# Patient Record
Sex: MALE | Race: WHITE | NOT HISPANIC OR LATINO | Employment: FULL TIME | URBAN - METROPOLITAN AREA
[De-identification: names, ages, dates, MRNs, and addresses within clinical notes are randomized per-mention and may not be internally consistent; named-entity substitution may affect disease eponyms.]

---

## 2020-06-13 DIAGNOSIS — Z20.822 SUSPECTED COVID-19 VIRUS INFECTION: ICD-10-CM

## 2020-08-11 ENCOUNTER — HISTORICAL (OUTPATIENT)
Dept: CARDIOLOGY | Facility: HOSPITAL | Age: 55
End: 2020-08-11

## 2020-08-12 ENCOUNTER — HISTORICAL (OUTPATIENT)
Dept: CARDIOLOGY | Facility: HOSPITAL | Age: 55
End: 2020-08-12

## 2020-10-12 ENCOUNTER — HISTORICAL (OUTPATIENT)
Dept: ADMINISTRATIVE | Facility: HOSPITAL | Age: 55
End: 2020-10-12

## 2021-03-11 ENCOUNTER — HISTORICAL (OUTPATIENT)
Dept: CARDIOLOGY | Facility: CLINIC | Age: 56
End: 2021-03-11

## 2021-03-11 LAB
ALBUMIN SERPL-MCNC: 3.9 GM/DL (ref 3.5–5)
ALBUMIN/GLOB SERPL: 1.4 RATIO (ref 1.1–2)
ALP SERPL-CCNC: 63 UNIT/L (ref 40–150)
ALT SERPL-CCNC: 10 UNIT/L (ref 0–55)
AST SERPL-CCNC: 12 UNIT/L (ref 5–34)
BILIRUB SERPL-MCNC: 0.5 MG/DL
BILIRUBIN DIRECT+TOT PNL SERPL-MCNC: 0.2 MG/DL (ref 0–0.5)
BILIRUBIN DIRECT+TOT PNL SERPL-MCNC: 0.3 MG/DL (ref 0–0.8)
BUN SERPL-MCNC: 12.2 MG/DL (ref 8.4–25.7)
CALCIUM SERPL-MCNC: 9.2 MG/DL (ref 8.4–10.2)
CHLORIDE SERPL-SCNC: 106 MMOL/L (ref 98–107)
CO2 SERPL-SCNC: 26 MMOL/L (ref 22–29)
CREAT SERPL-MCNC: 0.85 MG/DL (ref 0.73–1.18)
GLOBULIN SER-MCNC: 2.8 GM/DL (ref 2.4–3.5)
GLUCOSE SERPL-MCNC: 96 MG/DL (ref 74–100)
POTASSIUM SERPL-SCNC: 4 MMOL/L (ref 3.5–5.1)
PROT SERPL-MCNC: 6.7 GM/DL (ref 6.4–8.3)
SODIUM SERPL-SCNC: 139 MMOL/L (ref 136–145)

## 2021-04-16 ENCOUNTER — HISTORICAL (OUTPATIENT)
Dept: CARDIOLOGY | Facility: CLINIC | Age: 56
End: 2021-04-16

## 2021-04-16 LAB
ALBUMIN SERPL-MCNC: 4.1 GM/DL (ref 3.5–5)
ALBUMIN/GLOB SERPL: 1.3 RATIO (ref 1.1–2)
ALP SERPL-CCNC: 67 UNIT/L (ref 40–150)
ALT SERPL-CCNC: 10 UNIT/L (ref 0–55)
AST SERPL-CCNC: 17 UNIT/L (ref 5–34)
BILIRUB SERPL-MCNC: 0.7 MG/DL
BILIRUBIN DIRECT+TOT PNL SERPL-MCNC: 0.3 MG/DL (ref 0–0.5)
BILIRUBIN DIRECT+TOT PNL SERPL-MCNC: 0.4 MG/DL (ref 0–0.8)
BUN SERPL-MCNC: 12.8 MG/DL (ref 8.4–25.7)
CALCIUM SERPL-MCNC: 9.9 MG/DL (ref 8.4–10.2)
CHLORIDE SERPL-SCNC: 105 MMOL/L (ref 98–107)
CHOLEST SERPL-MCNC: 144 MG/DL
CHOLEST/HDLC SERPL: 3 {RATIO} (ref 0–5)
CO2 SERPL-SCNC: 24 MMOL/L (ref 22–29)
CREAT SERPL-MCNC: 0.8 MG/DL (ref 0.73–1.18)
GLOBULIN SER-MCNC: 3.1 GM/DL (ref 2.4–3.5)
GLUCOSE SERPL-MCNC: 99 MG/DL (ref 74–100)
HDLC SERPL-MCNC: 49 MG/DL (ref 35–60)
LDLC SERPL CALC-MCNC: 69 MG/DL (ref 50–140)
POTASSIUM SERPL-SCNC: 4.1 MMOL/L (ref 3.5–5.1)
PROT SERPL-MCNC: 7.2 GM/DL (ref 6.4–8.3)
SODIUM SERPL-SCNC: 138 MMOL/L (ref 136–145)
TRIGL SERPL-MCNC: 128 MG/DL (ref 34–140)
VLDLC SERPL CALC-MCNC: 26 MG/DL

## 2021-06-04 ENCOUNTER — HISTORICAL (OUTPATIENT)
Dept: SLEEP MEDICINE | Facility: HOSPITAL | Age: 56
End: 2021-06-04

## 2021-07-19 ENCOUNTER — HISTORICAL (OUTPATIENT)
Dept: ADMINISTRATIVE | Facility: HOSPITAL | Age: 56
End: 2021-07-19

## 2021-07-19 LAB — SARS-COV-2 RNA RESP QL NAA+PROBE: DETECTED

## 2021-10-22 ENCOUNTER — HISTORICAL (OUTPATIENT)
Dept: CARDIOLOGY | Facility: HOSPITAL | Age: 56
End: 2021-10-22

## 2021-10-25 ENCOUNTER — HISTORICAL (OUTPATIENT)
Dept: RADIOLOGY | Facility: HOSPITAL | Age: 56
End: 2021-10-25

## 2021-12-23 ENCOUNTER — HISTORICAL (OUTPATIENT)
Dept: RADIOLOGY | Facility: HOSPITAL | Age: 56
End: 2021-12-23

## 2022-01-20 ENCOUNTER — HISTORICAL (OUTPATIENT)
Dept: CARDIOLOGY | Facility: HOSPITAL | Age: 57
End: 2022-01-20

## 2022-04-09 ENCOUNTER — HISTORICAL (OUTPATIENT)
Dept: ADMINISTRATIVE | Facility: HOSPITAL | Age: 57
End: 2022-04-09
Payer: MEDICAID

## 2022-04-26 VITALS
HEIGHT: 67 IN | OXYGEN SATURATION: 97 % | BODY MASS INDEX: 38.76 KG/M2 | SYSTOLIC BLOOD PRESSURE: 113 MMHG | WEIGHT: 246.94 LBS | DIASTOLIC BLOOD PRESSURE: 68 MMHG

## 2022-07-28 ENCOUNTER — OFFICE VISIT (OUTPATIENT)
Dept: CARDIOLOGY | Facility: CLINIC | Age: 57
End: 2022-07-28
Payer: MEDICAID

## 2022-07-28 VITALS
HEIGHT: 68 IN | TEMPERATURE: 98 F | HEART RATE: 60 BPM | SYSTOLIC BLOOD PRESSURE: 108 MMHG | BODY MASS INDEX: 38.15 KG/M2 | OXYGEN SATURATION: 98 % | WEIGHT: 251.75 LBS | RESPIRATION RATE: 18 BRPM | DIASTOLIC BLOOD PRESSURE: 71 MMHG

## 2022-07-28 DIAGNOSIS — I10 HTN (HYPERTENSION), BENIGN: ICD-10-CM

## 2022-07-28 DIAGNOSIS — G47.33 OBSTRUCTIVE SLEEP APNEA: ICD-10-CM

## 2022-07-28 DIAGNOSIS — I25.10 CORONARY ARTERY DISEASE INVOLVING NATIVE CORONARY ARTERY OF NATIVE HEART WITHOUT ANGINA PECTORIS: Primary | ICD-10-CM

## 2022-07-28 DIAGNOSIS — E78.2 MIXED HYPERLIPIDEMIA: ICD-10-CM

## 2022-07-28 PROCEDURE — 3078F DIAST BP <80 MM HG: CPT | Mod: CPTII,,, | Performed by: NURSE PRACTITIONER

## 2022-07-28 PROCEDURE — 1159F MED LIST DOCD IN RCRD: CPT | Mod: CPTII,,, | Performed by: NURSE PRACTITIONER

## 2022-07-28 PROCEDURE — 99214 OFFICE O/P EST MOD 30 MIN: CPT | Mod: S$PBB,,, | Performed by: NURSE PRACTITIONER

## 2022-07-28 PROCEDURE — 3074F PR MOST RECENT SYSTOLIC BLOOD PRESSURE < 130 MM HG: ICD-10-PCS | Mod: CPTII,,, | Performed by: NURSE PRACTITIONER

## 2022-07-28 PROCEDURE — 3008F PR BODY MASS INDEX (BMI) DOCUMENTED: ICD-10-PCS | Mod: CPTII,,, | Performed by: NURSE PRACTITIONER

## 2022-07-28 PROCEDURE — 99214 PR OFFICE/OUTPT VISIT, EST, LEVL IV, 30-39 MIN: ICD-10-PCS | Mod: S$PBB,,, | Performed by: NURSE PRACTITIONER

## 2022-07-28 PROCEDURE — 3078F PR MOST RECENT DIASTOLIC BLOOD PRESSURE < 80 MM HG: ICD-10-PCS | Mod: CPTII,,, | Performed by: NURSE PRACTITIONER

## 2022-07-28 PROCEDURE — 1159F PR MEDICATION LIST DOCUMENTED IN MEDICAL RECORD: ICD-10-PCS | Mod: CPTII,,, | Performed by: NURSE PRACTITIONER

## 2022-07-28 PROCEDURE — 99214 OFFICE O/P EST MOD 30 MIN: CPT | Mod: PBBFAC | Performed by: NURSE PRACTITIONER

## 2022-07-28 PROCEDURE — 3008F BODY MASS INDEX DOCD: CPT | Mod: CPTII,,, | Performed by: NURSE PRACTITIONER

## 2022-07-28 PROCEDURE — 1160F PR REVIEW ALL MEDS BY PRESCRIBER/CLIN PHARMACIST DOCUMENTED: ICD-10-PCS | Mod: CPTII,,, | Performed by: NURSE PRACTITIONER

## 2022-07-28 PROCEDURE — 3074F SYST BP LT 130 MM HG: CPT | Mod: CPTII,,, | Performed by: NURSE PRACTITIONER

## 2022-07-28 PROCEDURE — 1160F RVW MEDS BY RX/DR IN RCRD: CPT | Mod: CPTII,,, | Performed by: NURSE PRACTITIONER

## 2022-07-28 RX ORDER — MELATONIN 5 MG
10 CAPSULE ORAL NIGHTLY
COMMUNITY

## 2022-07-28 RX ORDER — ISOSORBIDE MONONITRATE 30 MG/1
30 TABLET, EXTENDED RELEASE ORAL DAILY
Qty: 90 TABLET | Refills: 3 | Status: SHIPPED | OUTPATIENT
Start: 2022-07-28 | End: 2023-06-27 | Stop reason: SDUPTHER

## 2022-07-28 RX ORDER — ROSUVASTATIN CALCIUM 40 MG/1
40 TABLET, COATED ORAL DAILY
Qty: 90 TABLET | Refills: 3 | Status: SHIPPED | OUTPATIENT
Start: 2022-07-28 | End: 2023-06-27 | Stop reason: SDUPTHER

## 2022-07-28 RX ORDER — CLOPIDOGREL BISULFATE 75 MG/1
75 TABLET ORAL DAILY
Qty: 90 TABLET | Refills: 3 | Status: SHIPPED | OUTPATIENT
Start: 2022-07-28 | End: 2023-06-27 | Stop reason: SDUPTHER

## 2022-07-28 RX ORDER — ROSUVASTATIN CALCIUM 40 MG/1
40 TABLET, COATED ORAL DAILY
COMMUNITY
Start: 2022-07-26 | End: 2022-07-28 | Stop reason: SDUPTHER

## 2022-07-28 RX ORDER — METOPROLOL SUCCINATE 25 MG/1
25 TABLET, EXTENDED RELEASE ORAL DAILY
COMMUNITY
Start: 2022-07-15 | End: 2022-07-28 | Stop reason: SDUPTHER

## 2022-07-28 RX ORDER — METOPROLOL SUCCINATE 25 MG/1
25 TABLET, EXTENDED RELEASE ORAL DAILY
Qty: 90 TABLET | Refills: 3 | Status: SHIPPED | OUTPATIENT
Start: 2022-07-28 | End: 2022-10-28 | Stop reason: SDUPTHER

## 2022-07-28 RX ORDER — ISOSORBIDE MONONITRATE 30 MG/1
30 TABLET, EXTENDED RELEASE ORAL DAILY
COMMUNITY
Start: 2022-07-09 | End: 2022-07-28 | Stop reason: SDUPTHER

## 2022-07-28 RX ORDER — ALBUTEROL SULFATE 90 UG/1
90 AEROSOL, METERED RESPIRATORY (INHALATION)
COMMUNITY
Start: 2022-04-19

## 2022-07-28 RX ORDER — OMEPRAZOLE 20 MG/1
20 CAPSULE, DELAYED RELEASE ORAL
COMMUNITY

## 2022-07-28 RX ORDER — CLOPIDOGREL BISULFATE 75 MG/1
75 TABLET ORAL DAILY
COMMUNITY
Start: 2022-05-02 | End: 2022-07-28 | Stop reason: SDUPTHER

## 2022-07-28 RX ORDER — METOPROLOL TARTRATE 25 MG/1
25 TABLET, FILM COATED ORAL 2 TIMES DAILY
COMMUNITY
Start: 2022-02-26 | End: 2022-07-28

## 2022-07-28 RX ORDER — NITROGLYCERIN 0.4 MG/1
0.4 TABLET SUBLINGUAL
COMMUNITY
Start: 2021-12-28 | End: 2023-06-27 | Stop reason: SDUPTHER

## 2022-07-28 NOTE — PATIENT INSTRUCTIONS
Sleep study referral  Follow up in 3 months with CMP/FLP or sooner if needed  Follow up with PCP as directed

## 2022-07-28 NOTE — PROGRESS NOTES
"        CHIEF COMPLAINT:   Chief Complaint   Patient presents with    3mt f/u -      Pt reports 2 episodes of chest "sharp pain" x 30 seconds while at rest over the last two weeks.                     Review of patient's allergies indicates:   Allergen Reactions    Doxycycline Swelling                                          HPI:  Huseyin Sheth 56 y.o. male  with of HTN, HLD, Multivessel CAD with PCI in 2006, 2014, 2017 with multiple stents CABG 2020, who presents today for routine follow up and ongoing care.  Today the patient endorses 2 episodes of sharp chest pain that occurred while at rest that lasted approximately 30 seconds before resolving.  However he denies any exertional chest pain.  He does endorse stable shortness of breath with exertion that has not progressed since his last visit.  He states he is able to perform his normal ADLs and ambulates on a regular basis without any chest pain or significant shortness of breath.  He denies any palpitations, orthopnea, PND or syncope.  He endorses occasional left lower extremity burning pain.  Of note, the patient had recent SULTANA testing in October 2021 that revealed triphasic waveforms throughout with no evidence of arterial insufficiency.  He reports compliance with his current medications.                                                                                                                                                                                                                                                                                                                                                                                                                                             There is no problem list on file for this patient.    Past Surgical History:   Procedure Laterality Date    CABG x 3       Social History     Socioeconomic History    Marital status: Single   Tobacco Use    Smoking status: Former Smoker    Smokeless " "tobacco: Never Used   Substance and Sexual Activity    Alcohol use: Not Currently    Drug use: Never        Family History   Problem Relation Age of Onset    Stroke Mother     Heart disease Mother     Hypertension Mother     Hyperlipidemia Mother     Parkinsonism Father          Current Outpatient Medications:     albuterol (PROVENTIL/VENTOLIN HFA) 90 mcg/actuation inhaler, Inhale 90 puffs into the lungs as needed., Disp: , Rfl:     clopidogreL (PLAVIX) 75 mg tablet, Take 75 mg by mouth once daily., Disp: , Rfl:     isosorbide mononitrate (IMDUR) 30 MG 24 hr tablet, Take 30 mg by mouth once daily., Disp: , Rfl:     melatonin 5 mg Cap, Take 10 mg by mouth every evening., Disp: , Rfl:     metoprolol succinate (TOPROL-XL) 25 MG 24 hr tablet, Take 25 mg by mouth once daily., Disp: , Rfl:     nitroGLYCERIN (NITROSTAT) 0.4 MG SL tablet, Place 0.4 mg under the tongue as needed., Disp: , Rfl:     omeprazole (PRILOSEC) 20 MG capsule, Take 20 mg by mouth as needed., Disp: , Rfl:     rosuvastatin (CRESTOR) 40 MG Tab, Take 40 mg by mouth once daily., Disp: , Rfl:     metoprolol tartrate (LOPRESSOR) 25 MG tablet, Take 25 mg by mouth 2 (two) times daily., Disp: , Rfl:      ROS:                                                                                                                                                                             Review of Systems   Constitutional: Negative.    HENT: Negative.    Eyes: Negative.    Respiratory: Positive for shortness of breath.    Cardiovascular: Positive for chest pain.   Gastrointestinal: Negative.    Genitourinary: Negative.    Musculoskeletal: Negative.    Skin: Negative.    Neurological: Negative.    Endo/Heme/Allergies: Negative.    Psychiatric/Behavioral: Negative.         Blood pressure 108/71, pulse 60, temperature 98.1 °F (36.7 °C), resp. rate 18, height 5' 7.52" (1.715 m), weight 114.2 kg (251 lb 12.3 oz), SpO2 98 %.   PE:  Physical " Exam  Constitutional:       Appearance: Normal appearance.   HENT:      Head: Normocephalic.   Eyes:      Pupils: Pupils are equal, round, and reactive to light.   Cardiovascular:      Rate and Rhythm: Normal rate and regular rhythm.      Pulses: Normal pulses.   Pulmonary:      Effort: Pulmonary effort is normal.   Musculoskeletal:         General: Normal range of motion.   Skin:     General: Skin is warm and dry.   Neurological:      General: No focal deficit present.      Mental Status: He is alert and oriented to person, place, and time.   Psychiatric:         Mood and Affect: Mood normal.         Behavior: Behavior normal.                ASSESSMENT/PLAN:  Coronary artery disease   - s/p CABG x 3 (Oct. 2020)  - Reports two episodes of chest pain that occurred at rest. Denies any exertional CP.   - Continue Aspirin, Plavix, Imdur, Crestor, metoprolol succiate , and SL Nitro prn CP. May consider increasing Imdur if symptoms increase   - Counseled on heart healthy diet and exercise as tolerated     Sleep apnea - not on machine  - Reports history of sleep apnea but unable to tolerate machine because it did not fit well    -Sleep study done on 6/28/2021; recommendation = APAP @ 5-20 cmH2O  - Will resend referral      VIVEROS - persisted  - EF 55-60% (1/20/2022)  - Report stable VIVEROS.   - May benefit from PFTs, will defer to PCP    L Leg pain  - Reports intermittent Left leg burning   - Recent SULTANA testing Oct. 2021 that revealed no evidence of arterial insufficiency with triphasic waveforms throughout.  - Instructed patient to contact PCP, may be neuropathy?      Hypertension -at goal   - Continue current medicatios   - Counseled on low-sodium diet    HLD  - Lipid panel WNL (1/20/2022)  - Continue with Crestor 20 mg po daily  - Counseled patient on low-cholesterol, low-fat diet, and encourage exercise as tolerated  - repeat labs prior to next clinic visit     Single subsegmental pulmonary embolism without acute cor  pulmonale   - Xarelto previously discontinued after completion of 3 months of therapy    Obesity   - Counseled on weight loss through diet and exercise    HX of tobacco use  - Smoked 2 PPD x 40 years  - uit 2020      Sleep study referral  Follow up in 3 months with CMP/FLP or sooner if needed  Follow up with PCP as directed

## 2022-09-19 ENCOUNTER — LAB VISIT (OUTPATIENT)
Dept: LAB | Facility: HOSPITAL | Age: 57
End: 2022-09-19
Attending: NURSE PRACTITIONER
Payer: MEDICAID

## 2022-09-19 DIAGNOSIS — E78.5 HYPERLIPIDEMIA, UNSPECIFIED HYPERLIPIDEMIA TYPE: Primary | ICD-10-CM

## 2022-09-19 DIAGNOSIS — E78.5 HYPERLIPIDEMIA, UNSPECIFIED HYPERLIPIDEMIA TYPE: ICD-10-CM

## 2022-09-19 LAB
ALBUMIN SERPL-MCNC: 3.9 GM/DL (ref 3.5–5)
ALBUMIN/GLOB SERPL: 1.3 RATIO (ref 1.1–2)
ALP SERPL-CCNC: 58 UNIT/L (ref 40–150)
ALT SERPL-CCNC: 18 UNIT/L (ref 0–55)
AST SERPL-CCNC: 16 UNIT/L (ref 5–34)
BILIRUBIN DIRECT+TOT PNL SERPL-MCNC: 0.4 MG/DL
BUN SERPL-MCNC: 11.6 MG/DL (ref 8.4–25.7)
CALCIUM SERPL-MCNC: 9.4 MG/DL (ref 8.4–10.2)
CHLORIDE SERPL-SCNC: 106 MMOL/L (ref 98–107)
CHOLEST SERPL-MCNC: 139 MG/DL
CHOLEST/HDLC SERPL: 3 {RATIO} (ref 0–5)
CO2 SERPL-SCNC: 26 MMOL/L (ref 22–29)
CREAT SERPL-MCNC: 0.87 MG/DL (ref 0.73–1.18)
GFR SERPLBLD CREATININE-BSD FMLA CKD-EPI: >60 MLS/MIN/1.73/M2
GLOBULIN SER-MCNC: 2.9 GM/DL (ref 2.4–3.5)
GLUCOSE SERPL-MCNC: 100 MG/DL (ref 74–100)
HDLC SERPL-MCNC: 42 MG/DL (ref 35–60)
LDLC SERPL CALC-MCNC: 73 MG/DL (ref 50–140)
POTASSIUM SERPL-SCNC: 4.3 MMOL/L (ref 3.5–5.1)
PROT SERPL-MCNC: 6.8 GM/DL (ref 6.4–8.3)
SODIUM SERPL-SCNC: 139 MMOL/L (ref 136–145)
TRIGL SERPL-MCNC: 120 MG/DL (ref 34–140)
VLDLC SERPL CALC-MCNC: 24 MG/DL

## 2022-09-19 PROCEDURE — 80053 COMPREHEN METABOLIC PANEL: CPT

## 2022-09-19 PROCEDURE — 36415 COLL VENOUS BLD VENIPUNCTURE: CPT

## 2022-09-19 PROCEDURE — 80061 LIPID PANEL: CPT

## 2022-10-07 DIAGNOSIS — Z87.891 PERSONAL HISTORY OF TOBACCO USE, PRESENTING HAZARDS TO HEALTH: Primary | ICD-10-CM

## 2022-10-27 NOTE — PROGRESS NOTES
CHIEF COMPLAINT:   Chief Complaint   Patient presents with    Follow-up     Sleep study pt states he notices that when he bends over he gets naueseous and light headed                   Review of patient's allergies indicates:   Allergen Reactions    Doxycycline Swelling                                          HPI:  Huseyin Sheth 57 y.o.  with HTN, HLD, Multivessel CAD with multiple PCI in 2006, 2014, 2017, CABG 2020,  Hx PE, who presents today for routine follow up and ongoing care.  The patient completed echocardiogram in January 2022 that revealed intact EF 55-60%. (See report below).  He had SULTANA testing in October 2021 that revealed no evidence of significant arterial insufficiency.  Today the patient endorses episodes of lightheadedness and nausea when bending over.  He endorses occasional episodes of transient chest pain but denies any frequent or recent events.  He states he is able to perform his normal ADLs and ambulate on his job without any ischemic symptoms.  He denies palpitations, orthopnea, PND or syncope.  He does continue to endorse intermittent left lower extremity burning/numbness. He reports compliance with his current medications.  Of note, the patient has a previous diagnosis of KELIN. He was non-compliant due to anxiety and pressure intolerance. He is still having symptoms of EDS, snoring and witnessed apnea and is still in need of a PAP device. Titration ordered.     ECHO 1.20.22  Left ventricular ejection fraction is measured at approximately 55-60%   Structurally normal mitral valve  Mild posteriorly directed mitral regurgitation   Structurally normal trileaflet aortic valve   Tricuspid valve is structurally normal   Trace tricuspid regurgitation   Pulmonary  arterial systolic pressure is estimated to be normal (<36 mmHG)  Trace pulmonic regurgitation present   Mildly dilated left atrium  Normal right atrial size  Normal right ventricular chamber size and function                                                                                                                                                                                                                                                                                                                                                                                                                                                                                                                                                                                           Patient Active Problem List   Diagnosis    Coronary artery disease involving native coronary artery of native heart without angina pectoris    HTN (hypertension), benign    Mixed hyperlipidemia    Obstructive sleep apnea     Past Surgical History:   Procedure Laterality Date    CABG x 3       Social History     Socioeconomic History    Marital status: Single   Tobacco Use    Smoking status: Former    Smokeless tobacco: Never   Substance and Sexual Activity    Alcohol use: Not Currently    Drug use: Never        Family History   Problem Relation Age of Onset    Stroke Mother     Heart disease Mother     Hypertension Mother     Hyperlipidemia Mother     Parkinsonism Father          Current Outpatient Medications:     albuterol (PROVENTIL/VENTOLIN HFA) 90 mcg/actuation inhaler, Inhale 90 puffs into the lungs as needed., Disp: , Rfl:     amitriptyline (ELAVIL) 50 MG tablet, Take 50 mg by mouth every evening., Disp: , Rfl:     busPIRone (BUSPAR) 5 MG Tab, Take 5 mg by mouth 3 (three) times daily., Disp: , Rfl:     citalopram (CELEXA) 40 MG tablet, Take 40 mg by mouth once daily., Disp: , Rfl:     clopidogreL (PLAVIX) 75 mg tablet, Take 1 tablet (75 mg total) by mouth once daily., Disp: 90 tablet, Rfl: 3    isosorbide mononitrate (IMDUR) 30 MG 24 hr tablet, Take 1 tablet (30 mg total) by mouth once daily., Disp: 90 tablet, Rfl: 3    melatonin 5 mg Cap, Take 10 mg by mouth  "every evening., Disp: , Rfl:     metoprolol succinate (TOPROL-XL) 25 MG 24 hr tablet, Take 1 tablet (25 mg total) by mouth once daily., Disp: 90 tablet, Rfl: 3    nitroGLYCERIN (NITROSTAT) 0.4 MG SL tablet, Place 0.4 mg under the tongue as needed., Disp: , Rfl:     omeprazole (PRILOSEC) 20 MG capsule, Take 20 mg by mouth as needed., Disp: , Rfl:     omeprazole (PRILOSEC) 40 MG capsule, Take 40 mg by mouth once daily., Disp: , Rfl:     rosuvastatin (CRESTOR) 40 MG Tab, Take 1 tablet (40 mg total) by mouth once daily., Disp: 90 tablet, Rfl: 3    tamsulosin (FLOMAX) 0.4 mg Cap, Take 1 capsule by mouth once daily., Disp: , Rfl:      ROS:                                                                                                                                                                             Review of Systems   Constitutional: Negative.    HENT: Negative.     Eyes: Negative.    Respiratory: Negative.  Negative for shortness of breath.    Cardiovascular: Negative.    Gastrointestinal: Negative.    Genitourinary: Negative.    Musculoskeletal: Negative.    Skin: Negative.    Neurological:  Positive for dizziness.        Dizziness/lightheadedness with associated nausea    Endo/Heme/Allergies: Negative.    Psychiatric/Behavioral: Negative.        Blood pressure 109/80, pulse (!) 55, temperature 98.2 °F (36.8 °C), resp. rate 18, height 5' 6.97" (1.701 m), weight 117.8 kg (259 lb 11.2 oz), SpO2 95 %.   PE:  Physical Exam  Constitutional:       Appearance: Normal appearance.   HENT:      Head: Normocephalic.   Eyes:      Pupils: Pupils are equal, round, and reactive to light.   Cardiovascular:      Rate and Rhythm: Normal rate and regular rhythm.      Pulses: Normal pulses.   Pulmonary:      Effort: Pulmonary effort is normal.   Musculoskeletal:         General: Normal range of motion.   Skin:     General: Skin is warm and dry.   Neurological:      General: No focal deficit present.      Mental Status: He is " alert and oriented to person, place, and time.   Psychiatric:         Mood and Affect: Mood normal.         Behavior: Behavior normal.              ASSESSMENT/PLAN:  Coronary artery disease   - s/p CABG x 3 (Oct. 2020)  - Multiple PCI 2006, 2014, 2017  - reports occasional episodes of chest pain but denies any exertional chest pain with ambulating perform normal ADLs.  Denies any frequent or recent episodes  - Continue Aspirin, Plavix, Imdur, Crestor, metoprolol succinate (decrease) , and SL Nitro prn CP.  Consider maximize antianginals as tolerated if symptoms increase  - Counseled on heart healthy diet and exercise as tolerated     Bradycardia   - reports this intermittent lightheadedness/nausea when bending over  - EKG - SB- 56  - Will decrease metoprolol succinate to 12.5 mg q.day   - Nurse visit and to 4 weeks for BP/HR check    Dizziness/lightheadedness  - will order carotid ultrasound to rule stenosis considering risk factors       VIVEROS - unchanged  - EF 55-60% (1/20/2022)  - Report stable VIVEROS.   - May benefit from PFTs, will defer to PCP     L Leg pain  - Reports intermittent Left leg burning   - Recent SULTANA testing Oct. 2021 that revealed no evidence of arterial insufficiency with triphasic waveforms throughout.  - may be neuropathy related, will defer the PCP       Hypertension -at goal   - Continue current medicatios   - Counseled on low-sodium diet    HLD  - LDL- above goal -73  - Continue Crestor  40 mg po daily.  Home patient reports he does not follow a heart healthy diet.  Will allow patient time to make lifestyle modifications.  - Counseled patient on low-cholesterol, low-fat diet, and encourage exercise as tolerated  - will repeat FLP in 8 weeks.  If LDL remains above goal at that time, will add Zetia 10 mg to achieve goal of 70 or below     Single subsegmental pulmonary embolism without acute cor pulmonale   - Xarelto previously discontinued after completion of 3 months of therapy    Obesity   -  Counseled on weight loss through diet and exercise    HX of tobacco use  - Smoked 2 PPD x 40 years  - Quit 2020. Counseled on continued smoking cessation     Sleep apnea - not on machine  - previous diagnosis of KELIN.  He was noncompliant due to anxiety and pressure intolerance.  - Sleep study done on 6/28/2021; recommendation = APAP @ 5-20 cmH2O  - Titration order placed. He is still having symptoms of EDS, snoring and witnessed apnea and is still in need of a PAP device.        Decrease metoprolol succinate to 12.5 mg q.day   Nurse visit in 2 to 4 weeks for BP/HR check.  Carotid ultrasound  FLP in 8 weeks  Follow up in 6 months or sooner if needed  Follow up with PCP as directed

## 2022-10-28 ENCOUNTER — OFFICE VISIT (OUTPATIENT)
Dept: CARDIOLOGY | Facility: CLINIC | Age: 57
End: 2022-10-28
Payer: MEDICAID

## 2022-10-28 VITALS
OXYGEN SATURATION: 95 % | DIASTOLIC BLOOD PRESSURE: 80 MMHG | WEIGHT: 259.69 LBS | SYSTOLIC BLOOD PRESSURE: 109 MMHG | HEART RATE: 55 BPM | HEIGHT: 67 IN | BODY MASS INDEX: 40.76 KG/M2 | TEMPERATURE: 98 F | RESPIRATION RATE: 18 BRPM

## 2022-10-28 DIAGNOSIS — G47.33 OBSTRUCTIVE SLEEP APNEA: ICD-10-CM

## 2022-10-28 DIAGNOSIS — I10 HTN (HYPERTENSION), BENIGN: ICD-10-CM

## 2022-10-28 DIAGNOSIS — R00.1 BRADYCARDIA: ICD-10-CM

## 2022-10-28 DIAGNOSIS — R55 POSTURAL DIZZINESS WITH NEAR SYNCOPE: ICD-10-CM

## 2022-10-28 DIAGNOSIS — R42 POSTURAL DIZZINESS WITH NEAR SYNCOPE: ICD-10-CM

## 2022-10-28 DIAGNOSIS — I25.10 CORONARY ARTERY DISEASE INVOLVING NATIVE CORONARY ARTERY OF NATIVE HEART WITHOUT ANGINA PECTORIS: Primary | ICD-10-CM

## 2022-10-28 DIAGNOSIS — E78.2 MIXED HYPERLIPIDEMIA: ICD-10-CM

## 2022-10-28 PROCEDURE — 1160F PR REVIEW ALL MEDS BY PRESCRIBER/CLIN PHARMACIST DOCUMENTED: ICD-10-PCS | Mod: CPTII,,, | Performed by: NURSE PRACTITIONER

## 2022-10-28 PROCEDURE — 93005 ELECTROCARDIOGRAM TRACING: CPT

## 2022-10-28 PROCEDURE — 3079F PR MOST RECENT DIASTOLIC BLOOD PRESSURE 80-89 MM HG: ICD-10-PCS | Mod: CPTII,,, | Performed by: NURSE PRACTITIONER

## 2022-10-28 PROCEDURE — 99215 OFFICE O/P EST HI 40 MIN: CPT | Mod: PBBFAC | Performed by: NURSE PRACTITIONER

## 2022-10-28 PROCEDURE — 3079F DIAST BP 80-89 MM HG: CPT | Mod: CPTII,,, | Performed by: NURSE PRACTITIONER

## 2022-10-28 PROCEDURE — 99214 PR OFFICE/OUTPT VISIT, EST, LEVL IV, 30-39 MIN: ICD-10-PCS | Mod: S$PBB,,, | Performed by: NURSE PRACTITIONER

## 2022-10-28 PROCEDURE — 3074F SYST BP LT 130 MM HG: CPT | Mod: CPTII,,, | Performed by: NURSE PRACTITIONER

## 2022-10-28 PROCEDURE — 1159F MED LIST DOCD IN RCRD: CPT | Mod: CPTII,,, | Performed by: NURSE PRACTITIONER

## 2022-10-28 PROCEDURE — 1160F RVW MEDS BY RX/DR IN RCRD: CPT | Mod: CPTII,,, | Performed by: NURSE PRACTITIONER

## 2022-10-28 PROCEDURE — 3074F PR MOST RECENT SYSTOLIC BLOOD PRESSURE < 130 MM HG: ICD-10-PCS | Mod: CPTII,,, | Performed by: NURSE PRACTITIONER

## 2022-10-28 PROCEDURE — 99214 OFFICE O/P EST MOD 30 MIN: CPT | Mod: S$PBB,,, | Performed by: NURSE PRACTITIONER

## 2022-10-28 PROCEDURE — 1159F PR MEDICATION LIST DOCUMENTED IN MEDICAL RECORD: ICD-10-PCS | Mod: CPTII,,, | Performed by: NURSE PRACTITIONER

## 2022-10-28 RX ORDER — AMITRIPTYLINE HYDROCHLORIDE 50 MG/1
50 TABLET, FILM COATED ORAL NIGHTLY
COMMUNITY
Start: 2022-10-13

## 2022-10-28 RX ORDER — METOPROLOL SUCCINATE 25 MG/1
12.5 TABLET, EXTENDED RELEASE ORAL DAILY
Qty: 45 TABLET | Refills: 3 | Status: SHIPPED | OUTPATIENT
Start: 2022-10-28 | End: 2023-06-27 | Stop reason: SDUPTHER

## 2022-10-28 RX ORDER — OMEPRAZOLE 40 MG/1
40 CAPSULE, DELAYED RELEASE ORAL DAILY
COMMUNITY
Start: 2022-10-13

## 2022-10-28 RX ORDER — BUSPIRONE HYDROCHLORIDE 5 MG/1
5 TABLET ORAL 3 TIMES DAILY
COMMUNITY
Start: 2022-10-13

## 2022-10-28 RX ORDER — CITALOPRAM 40 MG/1
40 TABLET, FILM COATED ORAL DAILY
COMMUNITY
Start: 2022-10-15

## 2022-10-28 RX ORDER — TAMSULOSIN HYDROCHLORIDE 0.4 MG/1
1 CAPSULE ORAL DAILY
COMMUNITY
Start: 2022-10-13

## 2022-10-28 NOTE — PATIENT INSTRUCTIONS
Decrease metoprolol succinate to 12.5 mg q.day   Nurse visit in 2 to 4 weeks for BP/HR check.  Carotid ultrasound  FLP in 8 weeks  Follow up in 6 months or sooner if needed  Follow up with PCP as directed

## 2022-12-01 ENCOUNTER — HOSPITAL ENCOUNTER (OUTPATIENT)
Dept: RADIOLOGY | Facility: HOSPITAL | Age: 57
Discharge: HOME | End: 2022-12-01
Attending: NURSE PRACTITIONER
Payer: MEDICAID

## 2022-12-01 ENCOUNTER — CLINICAL SUPPORT (OUTPATIENT)
Dept: CARDIOLOGY | Facility: CLINIC | Age: 57
End: 2022-12-01
Payer: MEDICAID

## 2022-12-01 VITALS
RESPIRATION RATE: 18 BRPM | BODY MASS INDEX: 41.73 KG/M2 | TEMPERATURE: 98 F | WEIGHT: 265.88 LBS | DIASTOLIC BLOOD PRESSURE: 90 MMHG | OXYGEN SATURATION: 95 % | HEART RATE: 64 BPM | HEIGHT: 67 IN | SYSTOLIC BLOOD PRESSURE: 141 MMHG

## 2022-12-01 DIAGNOSIS — R42 POSTURAL DIZZINESS WITH NEAR SYNCOPE: Primary | ICD-10-CM

## 2022-12-01 DIAGNOSIS — R55 POSTURAL DIZZINESS WITH NEAR SYNCOPE: ICD-10-CM

## 2022-12-01 DIAGNOSIS — R55 POSTURAL DIZZINESS WITH NEAR SYNCOPE: Primary | ICD-10-CM

## 2022-12-01 DIAGNOSIS — R42 POSTURAL DIZZINESS WITH NEAR SYNCOPE: ICD-10-CM

## 2022-12-01 PROCEDURE — 93880 EXTRACRANIAL BILAT STUDY: CPT

## 2022-12-01 PROCEDURE — 99211 PR OFFICE/OUTPT VISIT, EST, LEVL I: ICD-10-PCS | Mod: S$PBB,,, | Performed by: NURSE PRACTITIONER

## 2022-12-01 PROCEDURE — 99214 OFFICE O/P EST MOD 30 MIN: CPT | Mod: PBBFAC,25

## 2022-12-01 PROCEDURE — 99211 OFF/OP EST MAY X REQ PHY/QHP: CPT | Mod: S$PBB,,, | Performed by: NURSE PRACTITIONER

## 2022-12-01 NOTE — PROGRESS NOTES
Pt seen in clinic today for b/p and hr check. LCV pt metoprolol decreased to 12.5 mg daily. Pt denies any cardiac targets including dizziness. Today b/p 141/90 HR 61 after 15 min b/p rechecked. 132/80 HR 61. Pt states he is medication compliant. Visit presented to Maddie Sharp NP. Pt instructed to continue with current medication regimen and keep all future follow up appointments. Pt verbalized understanding.

## 2022-12-02 LAB
LEFT CBA DIAS: 16 CM/S
LEFT CBA SYS: 47 CM/S
LEFT CCA DIST DIAS: 18 CM/S
LEFT CCA DIST SYS: 63 CM/S
LEFT CCA MID DIAS: 24 CM/S
LEFT CCA MID SYS: 97 CM/S
LEFT CCA PROX DIAS: 26 CM/S
LEFT CCA PROX SYS: 91 CM/S
LEFT ECA DIAS: 16 CM/S
LEFT ECA SYS: 61 CM/S
LEFT ICA DIST DIAS: 16 CM/S
LEFT ICA DIST SYS: 37 CM/S
LEFT ICA MID DIAS: 24 CM/S
LEFT ICA MID SYS: 71 CM/S
LEFT ICA PROX DIAS: 22 CM/S
LEFT ICA PROX SYS: 58 CM/S
LEFT VERTEBRAL DIAS: 11 CM/S
LEFT VERTEBRAL SYS: 25 CM/S
OHS CV CAROTID RIGHT ICA EDV HIGHEST: 20
OHS CV CAROTID ULTRASOUND LEFT ICA/CCA RATIO: 1.13
OHS CV CAROTID ULTRASOUND RIGHT ICA/CCA RATIO: 0.81
OHS CV PV CAROTID LEFT HIGHEST CCA: 97
OHS CV PV CAROTID LEFT HIGHEST ICA: 71
OHS CV PV CAROTID RIGHT HIGHEST CCA: 69
OHS CV PV CAROTID RIGHT HIGHEST ICA: 48
OHS CV US CAROTID LEFT HIGHEST EDV: 24
RIGHT CBA DIAS: 15 CM/S
RIGHT CBA SYS: 45 CM/S
RIGHT CCA DIST DIAS: 14 CM/S
RIGHT CCA DIST SYS: 59 CM/S
RIGHT CCA MID DIAS: 18 CM/S
RIGHT CCA MID SYS: 69 CM/S
RIGHT CCA PROX DIAS: 18 CM/S
RIGHT CCA PROX SYS: 68 CM/S
RIGHT ECA DIAS: 16 CM/S
RIGHT ECA SYS: 74 CM/S
RIGHT ICA DIST DIAS: 20 CM/S
RIGHT ICA DIST SYS: 48 CM/S
RIGHT ICA MID DIAS: 14 CM/S
RIGHT ICA MID SYS: 41 CM/S
RIGHT ICA PROX DIAS: 14 CM/S
RIGHT ICA PROX SYS: 45 CM/S
RIGHT VERTEBRAL DIAS: 10 CM/S
RIGHT VERTEBRAL SYS: 26 CM/S

## 2023-01-05 ENCOUNTER — HOSPITAL ENCOUNTER (OUTPATIENT)
Dept: RADIOLOGY | Facility: HOSPITAL | Age: 58
Discharge: HOME | End: 2023-01-05
Attending: INTERNAL MEDICINE
Payer: MEDICAID

## 2023-01-05 DIAGNOSIS — Z87.891 PERSONAL HISTORY OF TOBACCO USE, PRESENTING HAZARDS TO HEALTH: ICD-10-CM

## 2023-01-05 PROCEDURE — 71271 CT THORAX LUNG CANCER SCR C-: CPT | Mod: TC

## 2023-01-19 DIAGNOSIS — G47.33 OSA ON CPAP: Primary | ICD-10-CM

## 2023-01-26 NOTE — PROGRESS NOTES
CHIEF COMPLAINT:   Chief Complaint   Patient presents with    F/U 6 month visit denies SOB or C/P                   Review of patient's allergies indicates:   Allergen Reactions    Doxycycline Swelling                                          HPI:  Huseyin Sheth 57 y.o.  with HTN, HLD, Multivessel CAD with multiple PCI in 2006, 2014, 2017, CABG 2020,  Hx PE, who presents today for routine follow up and testing results.  The patient completed a carotid ultrasound on 12.1.22 that demonstrated no hemodynamically significant stenosis to bilateral internal carotid arteries.  He had an echocardiogram in January 2022 that revealed intact EF 55-60%. (See report below).  SULTANA testing in October 2021 revealed no evidence of significant arterial insufficiency.      Today the patient reports that he feels well overall.  He continues to endorse occasional shortness of breath with exertion but states that is unchanged since his last clinic visit.  He states he is able to perform his normal ADLs and job duties without experiencing any ischemic symptoms.  He denies any acute chest pain, palpitations, orthopnea, PND or syncope.  He continues to endorse intermittent left lower extremity burning/numbness.  He reports compliance with his current medications.  Of note, the patient reports resolution of his lightheadedness and dizziness with the decreased dose of metoprolol.    ECHO 1.20.22  Left ventricular ejection fraction is measured at approximately 55-60%   Structurally normal mitral valve  Mild posteriorly directed mitral regurgitation   Structurally normal trileaflet aortic valve   Tricuspid valve is structurally normal   Trace tricuspid regurgitation   Pulmonary  arterial systolic pressure is estimated to be normal (<36 mmHG)  Trace pulmonic regurgitation present   Mildly dilated left atrium  Normal right atrial size  Normal right ventricular chamber size and function                                                                                                                                                                                                                                                                                                                                                                                                                                                                                                                                                                                           Patient Active Problem List   Diagnosis    Coronary artery disease involving native coronary artery of native heart without angina pectoris    HTN (hypertension), benign    Mixed hyperlipidemia    Obstructive sleep apnea     Past Surgical History:   Procedure Laterality Date    CABG x 3       Social History     Socioeconomic History    Marital status: Single   Tobacco Use    Smoking status: Former    Smokeless tobacco: Never   Substance and Sexual Activity    Alcohol use: Not Currently    Drug use: Never        Family History   Problem Relation Age of Onset    Stroke Mother     Heart disease Mother     Hypertension Mother     Hyperlipidemia Mother     Parkinsonism Father          Current Outpatient Medications:     amitriptyline (ELAVIL) 50 MG tablet, Take 50 mg by mouth every evening., Disp: , Rfl:     busPIRone (BUSPAR) 5 MG Tab, Take 5 mg by mouth 3 (three) times daily., Disp: , Rfl:     citalopram (CELEXA) 40 MG tablet, Take 40 mg by mouth once daily., Disp: , Rfl:     clopidogreL (PLAVIX) 75 mg tablet, Take 1 tablet (75 mg total) by mouth once daily., Disp: 90 tablet, Rfl: 3    isosorbide mononitrate (IMDUR) 30 MG 24 hr tablet, Take 1 tablet (30 mg total) by mouth once daily., Disp: 90 tablet, Rfl: 3    melatonin 5 mg Cap, Take 10 mg by mouth every evening., Disp: , Rfl:     metoprolol succinate (TOPROL-XL) 25 MG 24 hr tablet, Take 0.5 tablets (12.5 mg total) by mouth once daily., Disp: 45 tablet,  "Rfl: 3    nitroGLYCERIN (NITROSTAT) 0.4 MG SL tablet, Place 0.4 mg under the tongue as needed., Disp: , Rfl:     omeprazole (PRILOSEC) 20 MG capsule, Take 20 mg by mouth as needed., Disp: , Rfl:     rosuvastatin (CRESTOR) 40 MG Tab, Take 1 tablet (40 mg total) by mouth once daily., Disp: 90 tablet, Rfl: 3    tamsulosin (FLOMAX) 0.4 mg Cap, Take 1 capsule by mouth once daily., Disp: , Rfl:     albuterol (PROVENTIL/VENTOLIN HFA) 90 mcg/actuation inhaler, Inhale 90 puffs into the lungs as needed., Disp: , Rfl:     omeprazole (PRILOSEC) 40 MG capsule, Take 40 mg by mouth once daily., Disp: , Rfl:      ROS:                                                                                                                                                                             Review of Systems   Constitutional: Negative.    HENT: Negative.     Eyes: Negative.    Respiratory:  Positive for shortness of breath.    Cardiovascular: Negative.    Gastrointestinal: Negative.    Genitourinary: Negative.    Musculoskeletal: Negative.    Skin: Negative.    Neurological:         Dizziness/lightheadedness with associated nausea    Endo/Heme/Allergies: Negative.    Psychiatric/Behavioral: Negative.        Blood pressure 109/73, pulse 67, temperature 97.8 °F (36.6 °C), temperature source Oral, resp. rate 20, height 5' 7" (1.702 m), weight 123.4 kg (272 lb 0.8 oz), SpO2 95 %.   PE:  Physical Exam  Constitutional:       Appearance: Normal appearance.   HENT:      Head: Normocephalic.   Eyes:      Pupils: Pupils are equal, round, and reactive to light.   Cardiovascular:      Rate and Rhythm: Normal rate and regular rhythm.      Pulses: Normal pulses.   Pulmonary:      Effort: Pulmonary effort is normal.   Musculoskeletal:         General: Normal range of motion.   Skin:     General: Skin is warm and dry.   Neurological:      General: No focal deficit present.      Mental Status: He is alert and oriented to person, place, and time. "   Psychiatric:         Mood and Affect: Mood normal.         Behavior: Behavior normal.              ASSESSMENT/PLAN:  Coronary artery disease   - s/p CABG x 3 (Oct. 2020)  - Multiple PCI 2006, 2014, 2017  - Denies CP. Reports stable VIVEROS  - Continue Aspirin, Plavix, Imdur, Crestor, metoprolol succinate, and SL Nitro prn CP. Patient wish to continue with DAPT   - Counseled on heart healthy diet and exercise as tolerated     Bradycardia   - reports resolution of lightheadedness/dizziness with decreased beta-blocker dose  - Continue metoprolol succinate 12.5 mg q.day     VIVEROS - unchanged  - EF 55-60% (1/20/2022)  - Report stable VIVEROS.   - May benefit from PFTs, will defer to PCP     L Leg pain  - Reports intermittent Left leg burning   -  SULTANA testing Oct. 2021 that revealed no evidence of arterial insufficiency with triphasic waveforms throughout.  - may be neuropathy related, will defer the PCP       Hypertension -at goal   - Continue current medicatios   - Counseled on low-sodium diet    HLD  - LDL- above goal -73  - Continue Crestor  40 mg po daily.  Home patient reports he does not follow a heart healthy diet.  Will allow patient time to make lifestyle modifications.  - Counseled patient on low-cholesterol, low-fat diet, and encourage exercise as tolerated  - Patient to complete labs.  If LDL remains above goal at that time, will add Zetia 10 mg to achieve goal of 70 or below     Single subsegmental pulmonary embolism without acute cor pulmonale   - Xarelto previously discontinued after completion of 3 months of therapy    Obesity   - Counseled on weight loss through diet and exercise    HX of tobacco use  - Smoked 2 PPD x 40 years  - Quit 2020. Counseled on continued smoking cessation     Sleep apnea - not on machine  - previous diagnosis of KELIN.  He was noncompliant due to anxiety and pressure intolerance.  - Sleep study done on 6/28/2021; recommendation = APAP @ 5-20 cmH2O  - Titration order placed. He is still  having symptoms of EDS, snoring and witnessed apnea and is still in need of a PAP device       Patient to complete labs   Follow up in cardiology clinic 6 months or sooner if needed  Follow up with PCP as directed

## 2023-01-27 ENCOUNTER — OFFICE VISIT (OUTPATIENT)
Dept: CARDIOLOGY | Facility: CLINIC | Age: 58
End: 2023-01-27
Payer: MEDICAID

## 2023-01-27 VITALS
DIASTOLIC BLOOD PRESSURE: 73 MMHG | RESPIRATION RATE: 20 BRPM | HEART RATE: 67 BPM | BODY MASS INDEX: 42.7 KG/M2 | WEIGHT: 272.06 LBS | HEIGHT: 67 IN | SYSTOLIC BLOOD PRESSURE: 109 MMHG | TEMPERATURE: 98 F | OXYGEN SATURATION: 95 %

## 2023-01-27 DIAGNOSIS — E78.2 MIXED HYPERLIPIDEMIA: ICD-10-CM

## 2023-01-27 DIAGNOSIS — I25.10 CORONARY ARTERY DISEASE INVOLVING NATIVE CORONARY ARTERY OF NATIVE HEART WITHOUT ANGINA PECTORIS: Primary | ICD-10-CM

## 2023-01-27 DIAGNOSIS — G47.33 OBSTRUCTIVE SLEEP APNEA: ICD-10-CM

## 2023-01-27 DIAGNOSIS — I10 HTN (HYPERTENSION), BENIGN: ICD-10-CM

## 2023-01-27 PROCEDURE — 1160F PR REVIEW ALL MEDS BY PRESCRIBER/CLIN PHARMACIST DOCUMENTED: ICD-10-PCS | Mod: CPTII,,, | Performed by: NURSE PRACTITIONER

## 2023-01-27 PROCEDURE — 99214 OFFICE O/P EST MOD 30 MIN: CPT | Mod: S$PBB,,, | Performed by: NURSE PRACTITIONER

## 2023-01-27 PROCEDURE — 1159F MED LIST DOCD IN RCRD: CPT | Mod: CPTII,,, | Performed by: NURSE PRACTITIONER

## 2023-01-27 PROCEDURE — 3074F PR MOST RECENT SYSTOLIC BLOOD PRESSURE < 130 MM HG: ICD-10-PCS | Mod: CPTII,,, | Performed by: NURSE PRACTITIONER

## 2023-01-27 PROCEDURE — 3078F PR MOST RECENT DIASTOLIC BLOOD PRESSURE < 80 MM HG: ICD-10-PCS | Mod: CPTII,,, | Performed by: NURSE PRACTITIONER

## 2023-01-27 PROCEDURE — 99214 PR OFFICE/OUTPT VISIT, EST, LEVL IV, 30-39 MIN: ICD-10-PCS | Mod: S$PBB,,, | Performed by: NURSE PRACTITIONER

## 2023-01-27 PROCEDURE — 3078F DIAST BP <80 MM HG: CPT | Mod: CPTII,,, | Performed by: NURSE PRACTITIONER

## 2023-01-27 PROCEDURE — 99214 OFFICE O/P EST MOD 30 MIN: CPT | Mod: PBBFAC | Performed by: NURSE PRACTITIONER

## 2023-01-27 PROCEDURE — 3008F PR BODY MASS INDEX (BMI) DOCUMENTED: ICD-10-PCS | Mod: CPTII,,, | Performed by: NURSE PRACTITIONER

## 2023-01-27 PROCEDURE — 1160F RVW MEDS BY RX/DR IN RCRD: CPT | Mod: CPTII,,, | Performed by: NURSE PRACTITIONER

## 2023-01-27 PROCEDURE — 3008F BODY MASS INDEX DOCD: CPT | Mod: CPTII,,, | Performed by: NURSE PRACTITIONER

## 2023-01-27 PROCEDURE — 1159F PR MEDICATION LIST DOCUMENTED IN MEDICAL RECORD: ICD-10-PCS | Mod: CPTII,,, | Performed by: NURSE PRACTITIONER

## 2023-01-27 PROCEDURE — 3074F SYST BP LT 130 MM HG: CPT | Mod: CPTII,,, | Performed by: NURSE PRACTITIONER

## 2023-01-27 NOTE — PATIENT INSTRUCTIONS
Patient to complete labs   Follow up in cardiology clinic 6 months or sooner if needed  Follow up with PCP as directed

## 2023-02-01 DIAGNOSIS — G47.33 OSA ON CPAP: Primary | ICD-10-CM

## 2023-02-25 ENCOUNTER — PROCEDURE VISIT (OUTPATIENT)
Dept: SLEEP MEDICINE | Facility: HOSPITAL | Age: 58
End: 2023-02-25
Attending: NURSE PRACTITIONER
Payer: MEDICAID

## 2023-02-25 DIAGNOSIS — G47.33 OSA ON CPAP: ICD-10-CM

## 2023-02-25 PROCEDURE — 95811 POLYSOM 6/>YRS CPAP 4/> PARM: CPT

## 2023-04-10 ENCOUNTER — LAB VISIT (OUTPATIENT)
Dept: LAB | Facility: HOSPITAL | Age: 58
End: 2023-04-10
Attending: NURSE PRACTITIONER
Payer: MEDICAID

## 2023-04-10 DIAGNOSIS — E78.2 MIXED HYPERLIPIDEMIA: ICD-10-CM

## 2023-04-10 LAB
CHOLEST SERPL-MCNC: 139 MG/DL
CHOLEST/HDLC SERPL: 3 {RATIO} (ref 0–5)
HDLC SERPL-MCNC: 45 MG/DL (ref 35–60)
LDLC SERPL CALC-MCNC: 70 MG/DL (ref 50–140)
TRIGL SERPL-MCNC: 119 MG/DL (ref 34–140)
VLDLC SERPL CALC-MCNC: 24 MG/DL

## 2023-04-10 PROCEDURE — 36415 COLL VENOUS BLD VENIPUNCTURE: CPT

## 2023-04-10 PROCEDURE — 80061 LIPID PANEL: CPT

## 2023-04-25 DIAGNOSIS — R06.00 DYSPNEA: Primary | ICD-10-CM

## 2023-05-23 ENCOUNTER — PROCEDURE VISIT (OUTPATIENT)
Dept: RESPIRATORY THERAPY | Facility: HOSPITAL | Age: 58
End: 2023-05-23
Attending: INTERNAL MEDICINE
Payer: MEDICAID

## 2023-05-23 VITALS — HEART RATE: 73 BPM | RESPIRATION RATE: 18 BRPM | OXYGEN SATURATION: 97 %

## 2023-05-23 DIAGNOSIS — R06.00 DYSPNEA: Primary | ICD-10-CM

## 2023-05-23 LAB
DLCO ADJ PRE: 23.53 ML/(MIN*MMHG) (ref 20.23–34.09)
DLCO SINGLE BREATH LLN: 20.23
DLCO SINGLE BREATH PRE REF: 86.6 %
DLCO SINGLE BREATH REF: 27.16
DLCOC SBVA LLN: 2.88
DLCOC SBVA PRE REF: 105.4 %
DLCOC SBVA REF: 4.18
DLCOC SINGLE BREATH LLN: 20.23
DLCOC SINGLE BREATH PRE REF: 86.6 %
DLCOC SINGLE BREATH REF: 27.16
DLCOVA LLN: 2.88
DLCOVA PRE REF: 105.4 %
DLCOVA PRE: 4.4 ML/(MIN*MMHG*L) (ref 2.88–5.48)
DLCOVA REF: 4.18
DLVAADJ PRE: 4.4 ML/(MIN*MMHG*L) (ref 2.88–5.48)
ERV LLN: -16448.85
ERV PRE REF: 61.7 %
ERV REF: 1.15
FEF 25 75 CHG: -20 %
FEF 25 75 LLN: 1.47
FEF 25 75 POST REF: 20.9 %
FEF 25 75 PRE REF: 26.2 %
FEF 25 75 REF: 2.91
FET100 CHG: 21.1 %
FEV1 CHG: 2.1 %
FEV1 FVC CHG: -7 %
FEV1 FVC LLN: 67
FEV1 FVC POST REF: 74.8 %
FEV1 FVC PRE REF: 80.4 %
FEV1 FVC REF: 78
FEV1 LLN: 2.53
FEV1 POST REF: 54.9 %
FEV1 PRE REF: 53.7 %
FEV1 REF: 3.33
FRCPLETH LLN: 2.41
FRCPLETH PREREF: 112.1 %
FRCPLETH REF: 3.4
FVC CHG: 9.8 %
FVC LLN: 3.26
FVC POST REF: 73.3 %
FVC PRE REF: 66.8 %
FVC REF: 4.25
IVC PRE: 2.39 L (ref 3.26–5.24)
IVC SINGLE BREATH LLN: 3.26
IVC SINGLE BREATH PRE REF: 56.3 %
IVC SINGLE BREATH REF: 4.25
MVV LLN: 108
MVV PRE REF: 58.4 %
MVV REF: 127
PEF CHG: -3.1 %
PEF LLN: 6.64
PEF POST REF: 72.6 %
PEF PRE REF: 74.9 %
PEF REF: 8.75
POST FEF 25 75: 0.61 L/S (ref 1.47–4.35)
POST FET 100: 15.48 SEC
POST FEV1 FVC: 58.64 % (ref 66.66–90.21)
POST FEV1: 1.83 L (ref 2.53–4.12)
POST FVC: 3.11 L (ref 3.26–5.24)
POST PEF: 6.36 L/S (ref 6.64–10.87)
PRE DLCO: 23.53 ML/(MIN*MMHG) (ref 20.23–34.09)
PRE ERV: 0.71 L (ref -16448.85–16451.15)
PRE FEF 25 75: 0.76 L/S (ref 1.47–4.35)
PRE FET 100: 12.78 SEC
PRE FEV1 FVC: 63.02 % (ref 66.66–90.21)
PRE FEV1: 1.79 L (ref 2.53–4.12)
PRE FRC PL: 3.81 L
PRE FVC: 2.84 L (ref 3.26–5.24)
PRE MVV: 74 L/MIN (ref 107.68–145.68)
PRE PEF: 6.56 L/S (ref 6.64–10.87)
PRE RV: 3.1 L (ref 1.58–2.93)
PRE TLC: 6.35 L (ref 5.35–7.65)
RAW LLN: 3.06
RAW PRE REF: 104.1 %
RAW PRE: 3.18 CMH2O*S/L (ref 3.06–3.06)
RAW REF: 3.06
RV LLN: 1.58
RV PRE REF: 137.5 %
RV REF: 2.25
RVTLC LLN: 27
RVTLC PRE REF: 134.6 %
RVTLC PRE: 48.72 % (ref 27.21–45.17)
RVTLC REF: 36
TLC LLN: 5.35
TLC PRE REF: 97.7 %
TLC REF: 6.5
VA PRE: 5.35 L (ref 6.35–6.35)
VA SINGLE BREATH LLN: 6.35
VA SINGLE BREATH PRE REF: 84.2 %
VA SINGLE BREATH REF: 6.35
VC LLN: 3.26
VC PRE REF: 76.7 %
VC PRE: 3.26 L (ref 3.26–5.24)
VC REF: 4.25
VTGRAWPRE: 4.61 L

## 2023-05-23 PROCEDURE — 94729 DIFFUSING CAPACITY: CPT

## 2023-05-23 PROCEDURE — 94060 EVALUATION OF WHEEZING: CPT

## 2023-05-23 PROCEDURE — 94761 N-INVAS EAR/PLS OXIMETRY MLT: CPT

## 2023-05-23 PROCEDURE — 94727 GAS DIL/WSHOT DETER LNG VOL: CPT

## 2023-05-23 PROCEDURE — 25000242 PHARM REV CODE 250 ALT 637 W/ HCPCS: Performed by: INTERNAL MEDICINE

## 2023-05-23 RX ORDER — ALBUTEROL SULFATE 0.83 MG/ML
2.5 SOLUTION RESPIRATORY (INHALATION)
Status: COMPLETED | OUTPATIENT
Start: 2023-05-23 | End: 2023-05-23

## 2023-05-23 RX ADMIN — ALBUTEROL SULFATE 2.5 MG: 2.5 SOLUTION RESPIRATORY (INHALATION) at 10:05

## 2023-06-27 ENCOUNTER — OFFICE VISIT (OUTPATIENT)
Dept: CARDIOLOGY | Facility: CLINIC | Age: 58
End: 2023-06-27
Payer: MEDICAID

## 2023-06-27 VITALS
OXYGEN SATURATION: 95 % | HEART RATE: 66 BPM | TEMPERATURE: 99 F | HEIGHT: 67 IN | WEIGHT: 270.5 LBS | DIASTOLIC BLOOD PRESSURE: 71 MMHG | BODY MASS INDEX: 42.46 KG/M2 | SYSTOLIC BLOOD PRESSURE: 111 MMHG | RESPIRATION RATE: 20 BRPM

## 2023-06-27 DIAGNOSIS — I10 HTN (HYPERTENSION), BENIGN: ICD-10-CM

## 2023-06-27 DIAGNOSIS — I25.10 CORONARY ARTERY DISEASE INVOLVING NATIVE CORONARY ARTERY OF NATIVE HEART WITHOUT ANGINA PECTORIS: Primary | ICD-10-CM

## 2023-06-27 DIAGNOSIS — G47.33 OBSTRUCTIVE SLEEP APNEA: ICD-10-CM

## 2023-06-27 DIAGNOSIS — E78.2 MIXED HYPERLIPIDEMIA: ICD-10-CM

## 2023-06-27 PROCEDURE — 1159F PR MEDICATION LIST DOCUMENTED IN MEDICAL RECORD: ICD-10-PCS | Mod: CPTII,,, | Performed by: NURSE PRACTITIONER

## 2023-06-27 PROCEDURE — 1159F MED LIST DOCD IN RCRD: CPT | Mod: CPTII,,, | Performed by: NURSE PRACTITIONER

## 2023-06-27 PROCEDURE — 99214 OFFICE O/P EST MOD 30 MIN: CPT | Mod: S$PBB,,, | Performed by: NURSE PRACTITIONER

## 2023-06-27 PROCEDURE — 93005 ELECTROCARDIOGRAM TRACING: CPT

## 2023-06-27 PROCEDURE — 3074F PR MOST RECENT SYSTOLIC BLOOD PRESSURE < 130 MM HG: ICD-10-PCS | Mod: CPTII,,, | Performed by: NURSE PRACTITIONER

## 2023-06-27 PROCEDURE — 3078F PR MOST RECENT DIASTOLIC BLOOD PRESSURE < 80 MM HG: ICD-10-PCS | Mod: CPTII,,, | Performed by: NURSE PRACTITIONER

## 2023-06-27 PROCEDURE — 99214 PR OFFICE/OUTPT VISIT, EST, LEVL IV, 30-39 MIN: ICD-10-PCS | Mod: S$PBB,,, | Performed by: NURSE PRACTITIONER

## 2023-06-27 PROCEDURE — 1160F RVW MEDS BY RX/DR IN RCRD: CPT | Mod: CPTII,,, | Performed by: NURSE PRACTITIONER

## 2023-06-27 PROCEDURE — 1160F PR REVIEW ALL MEDS BY PRESCRIBER/CLIN PHARMACIST DOCUMENTED: ICD-10-PCS | Mod: CPTII,,, | Performed by: NURSE PRACTITIONER

## 2023-06-27 PROCEDURE — 3008F BODY MASS INDEX DOCD: CPT | Mod: CPTII,,, | Performed by: NURSE PRACTITIONER

## 2023-06-27 PROCEDURE — 3074F SYST BP LT 130 MM HG: CPT | Mod: CPTII,,, | Performed by: NURSE PRACTITIONER

## 2023-06-27 PROCEDURE — 3078F DIAST BP <80 MM HG: CPT | Mod: CPTII,,, | Performed by: NURSE PRACTITIONER

## 2023-06-27 PROCEDURE — 3008F PR BODY MASS INDEX (BMI) DOCUMENTED: ICD-10-PCS | Mod: CPTII,,, | Performed by: NURSE PRACTITIONER

## 2023-06-27 PROCEDURE — 99214 OFFICE O/P EST MOD 30 MIN: CPT | Mod: PBBFAC | Performed by: NURSE PRACTITIONER

## 2023-06-27 RX ORDER — METOPROLOL SUCCINATE 25 MG/1
12.5 TABLET, EXTENDED RELEASE ORAL DAILY
Qty: 45 TABLET | Refills: 3 | Status: SHIPPED | OUTPATIENT
Start: 2023-06-27 | End: 2024-06-26

## 2023-06-27 RX ORDER — NITROGLYCERIN 0.4 MG/1
0.4 TABLET SUBLINGUAL
Qty: 25 TABLET | Refills: 3 | Status: SHIPPED | OUTPATIENT
Start: 2023-06-27 | End: 2024-06-26

## 2023-06-27 RX ORDER — CLOPIDOGREL BISULFATE 75 MG/1
75 TABLET ORAL DAILY
Qty: 90 TABLET | Refills: 3 | Status: SHIPPED | OUTPATIENT
Start: 2023-06-27 | End: 2024-06-26

## 2023-06-27 RX ORDER — ISOSORBIDE MONONITRATE 30 MG/1
30 TABLET, EXTENDED RELEASE ORAL DAILY
Qty: 90 TABLET | Refills: 3 | Status: SHIPPED | OUTPATIENT
Start: 2023-06-27 | End: 2024-06-26

## 2023-06-27 RX ORDER — ROSUVASTATIN CALCIUM 40 MG/1
40 TABLET, COATED ORAL DAILY
Qty: 90 TABLET | Refills: 3 | Status: SHIPPED | OUTPATIENT
Start: 2023-06-27 | End: 2024-06-26

## 2023-06-27 NOTE — PATIENT INSTRUCTIONS
EKG  Follow up in cardiology clinic in 4 months or sooner if needed  Follow up with PCP as directed

## 2023-06-27 NOTE — PROGRESS NOTES
CHIEF COMPLAINT:   Chief Complaint   Patient presents with    Follow-up     6 mos f/u sob w/wexertion                   Review of patient's allergies indicates:   Allergen Reactions    Doxycycline Swelling                                          HPI:  Huseyin Sheth 57 y.o.  with HTN, HLD, Multivessel CAD with multiple PCI in 2006, 2014, 2017, CABG 2020,  Hx PE, who presents today for routine follow up and ongoing care.  The patient completed a carotid ultrasound on 12.1.22 that demonstrated no hemodynamically significant stenosis to bilateral internal carotid arteries.  He had an echocardiogram in January 2022 that revealed intact EF 55-60%. (See report below).  SULTANA testing in October 2021 revealed no evidence of significant arterial insufficiency.      The patient states that he completed PFTs since last seen and was recently diagnosed with COPD.  However, he is awaiting an appointment from a recently placed pulmonology referral per PCP. He denies any chest pain, palpitations, orthopnea, PND, lightheadedness or syncope.  He states he is able to perform his normal ADLs and job duties without chest pain or significant shortness of breath.  He reports compliance with current medications and nightly compliance with CPAP. Unfortunately, the patient reports that his insurance will run out at the end of the month and he is unsure of the ability to afford any future testing.     Cardiac Testing   ECHO 1.20.22  Left ventricular ejection fraction is measured at approximately 55-60%   Structurally normal mitral valve  Mild posteriorly directed mitral regurgitation   Structurally normal trileaflet aortic valve   Tricuspid valve is structurally normal   Trace tricuspid regurgitation   Pulmonary  arterial systolic pressure is estimated to be normal (<36 mmHG)  Trace pulmonic regurgitation present   Mildly dilated left atrium  Normal right atrial size  Normal right ventricular chamber size and function                                                                                                                                                                                                                                                                                                                                                                                                                                                                                                                                                                                           Patient Active Problem List   Diagnosis    Coronary artery disease involving native coronary artery of native heart without angina pectoris    HTN (hypertension), benign    Mixed hyperlipidemia    Obstructive sleep apnea    Dyspnea     Past Surgical History:   Procedure Laterality Date    CABG x 3       Social History     Socioeconomic History    Marital status:    Tobacco Use    Smoking status: Former    Smokeless tobacco: Never   Substance and Sexual Activity    Alcohol use: Not Currently    Drug use: Never        Family History   Problem Relation Age of Onset    Stroke Mother     Heart disease Mother     Hypertension Mother     Hyperlipidemia Mother     Parkinsonism Father          Current Outpatient Medications:     albuterol (PROVENTIL/VENTOLIN HFA) 90 mcg/actuation inhaler, Inhale 90 puffs into the lungs as needed., Disp: , Rfl:     amitriptyline (ELAVIL) 50 MG tablet, Take 50 mg by mouth every evening., Disp: , Rfl:     busPIRone (BUSPAR) 5 MG Tab, Take 5 mg by mouth 3 (three) times daily., Disp: , Rfl:     citalopram (CELEXA) 40 MG tablet, Take 40 mg by mouth once daily., Disp: , Rfl:     clopidogreL (PLAVIX) 75 mg tablet, Take 1 tablet (75 mg total) by mouth once daily., Disp: 90 tablet, Rfl: 3    isosorbide mononitrate (IMDUR) 30 MG 24 hr tablet, Take 1 tablet (30 mg total) by mouth once daily., Disp: 90 tablet, Rfl: 3    melatonin 5 mg Cap, Take 10 mg  "by mouth every evening., Disp: , Rfl:     metoprolol succinate (TOPROL-XL) 25 MG 24 hr tablet, Take 0.5 tablets (12.5 mg total) by mouth once daily., Disp: 45 tablet, Rfl: 3    nitroGLYCERIN (NITROSTAT) 0.4 MG SL tablet, Place 0.4 mg under the tongue as needed., Disp: , Rfl:     omeprazole (PRILOSEC) 20 MG capsule, Take 20 mg by mouth as needed., Disp: , Rfl:     rosuvastatin (CRESTOR) 40 MG Tab, Take 1 tablet (40 mg total) by mouth once daily., Disp: 90 tablet, Rfl: 3    tamsulosin (FLOMAX) 0.4 mg Cap, Take 1 capsule by mouth once daily., Disp: , Rfl:     omeprazole (PRILOSEC) 40 MG capsule, Take 40 mg by mouth once daily., Disp: , Rfl:      ROS:                                                                                                                                                                             Review of Systems   Constitutional: Negative.    HENT: Negative.     Eyes: Negative.    Respiratory:  Positive for shortness of breath.    Cardiovascular: Negative.    Gastrointestinal: Negative.    Genitourinary: Negative.    Musculoskeletal: Negative.    Skin: Negative.    Neurological:         Dizziness/lightheadedness with associated nausea    Endo/Heme/Allergies: Negative.    Psychiatric/Behavioral: Negative.        Blood pressure 111/71, pulse 66, temperature 98.5 °F (36.9 °C), resp. rate 20, height 5' 7" (1.702 m), weight 122.7 kg (270 lb 8.1 oz), SpO2 95 %.   PE:  Physical Exam  Constitutional:       Appearance: Normal appearance.   HENT:      Head: Normocephalic.   Eyes:      Pupils: Pupils are equal, round, and reactive to light.   Cardiovascular:      Rate and Rhythm: Normal rate and regular rhythm.      Pulses: Normal pulses.   Pulmonary:      Effort: Pulmonary effort is normal.   Musculoskeletal:         General: Normal range of motion.   Skin:     General: Skin is warm and dry.   Neurological:      General: No focal deficit present.      Mental Status: He is alert and oriented to person, " place, and time.   Psychiatric:         Mood and Affect: Mood normal.         Behavior: Behavior normal.              ASSESSMENT/PLAN:  Coronary artery disease   - s/p CABG x 3 (Oct. 2020)  - Multiple PCI 2006, 2014, 2017  - Denies CP. Reports stable VIVEROS  - Continue Aspirin, Plavix, Imdur, Crestor, metoprolol succinate, and SL Nitro prn CP. Patient wish to continue with DAPT   - Counseled on heart healthy diet and exercise as tolerated     Bradycardia   - Continue metoprolol succinate 12.5 mg q.day. unable to tolerate higher doses due to dizziness      COPD  VIVEROS - unchanged  - EF 55-60% (1/20/2022)  - Report stable VIVEROS.   - reports had recent PFTs that indicated COPD.  Patient has pending referral to pulmonology     L Leg pain  - Reports intermittent Left leg burning   -  SULTANA testing Oct. 2021 that revealed no evidence of arterial insufficiency with triphasic waveforms throughout.  - may be neuropathy related, will defer the PCP       Hypertension - BP at goal   - Continue current medicatios   - Counseled on low-sodium diet    HLD  - LDL- 70- at goal   - Continue Crestor  40 mg po daily  - Counseled patient on low-cholesterol, low-fat diet, and encourage exercise as tolerated      Single subsegmental pulmonary embolism without acute cor pulmonale   - Xarelto previously discontinued after completion of 3 months of therapy    Obesity   - Counseled on weight loss through diet and exercise    HX of tobacco use  - Smoked 2 PPD x 40 years  - Quit 2020. Counseled on continued smoking cessation     Sleep apnea - CPAP  - Reports nightly compliance with CPAP         EKG  Follow up in cardiology clinic 4 months or sooner if needed  Follow up with PCP as directed

## 2024-01-17 DIAGNOSIS — Z87.891 HISTORY OF TOBACCO USE: Primary | ICD-10-CM

## 2024-02-22 ENCOUNTER — HOSPITAL ENCOUNTER (EMERGENCY)
Facility: HOSPITAL | Age: 59
Discharge: HOME | End: 2024-02-23
Attending: EMERGENCY MEDICINE
Payer: MEDICAID

## 2024-02-22 VITALS
OXYGEN SATURATION: 97 % | HEART RATE: 107 BPM | WEIGHT: 275.13 LBS | DIASTOLIC BLOOD PRESSURE: 88 MMHG | TEMPERATURE: 98 F | HEIGHT: 67 IN | BODY MASS INDEX: 43.18 KG/M2 | SYSTOLIC BLOOD PRESSURE: 147 MMHG | RESPIRATION RATE: 16 BRPM

## 2024-02-22 DIAGNOSIS — M25.561 RIGHT KNEE PAIN: Primary | ICD-10-CM

## 2024-02-22 LAB
ALBUMIN SERPL-MCNC: 3.9 G/DL (ref 3.5–5)
ALBUMIN/GLOB SERPL: 1.2 RATIO (ref 1.1–2)
ALP SERPL-CCNC: 77 UNIT/L (ref 40–150)
ALT SERPL-CCNC: 38 UNIT/L (ref 0–55)
AST SERPL-CCNC: 29 UNIT/L (ref 5–34)
BASOPHILS # BLD AUTO: 0.08 X10(3)/MCL
BASOPHILS NFR BLD AUTO: 1.1 %
BILIRUB SERPL-MCNC: 0.5 MG/DL
BUN SERPL-MCNC: 9.6 MG/DL (ref 8.4–25.7)
CALCIUM SERPL-MCNC: 9.5 MG/DL (ref 8.4–10.2)
CHLORIDE SERPL-SCNC: 106 MMOL/L (ref 98–107)
CO2 SERPL-SCNC: 23 MMOL/L (ref 22–29)
CREAT SERPL-MCNC: 1.43 MG/DL (ref 0.73–1.18)
CRP SERPL-MCNC: 7 MG/L
EOSINOPHIL # BLD AUTO: 0.48 X10(3)/MCL (ref 0–0.9)
EOSINOPHIL NFR BLD AUTO: 6.4 %
ERYTHROCYTE [DISTWIDTH] IN BLOOD BY AUTOMATED COUNT: 14.3 % (ref 11.5–17)
ERYTHROCYTE [SEDIMENTATION RATE] IN BLOOD: 15 MM/HR (ref 0–15)
GFR SERPLBLD CREATININE-BSD FMLA CKD-EPI: 57 MLS/MIN/1.73/M2
GLOBULIN SER-MCNC: 3.3 GM/DL (ref 2.4–3.5)
GLUCOSE SERPL-MCNC: 95 MG/DL (ref 74–100)
HCT VFR BLD AUTO: 43.7 % (ref 42–52)
HGB BLD-MCNC: 15.1 G/DL (ref 14–18)
IMM GRANULOCYTES # BLD AUTO: 0.02 X10(3)/MCL (ref 0–0.04)
IMM GRANULOCYTES NFR BLD AUTO: 0.3 %
LYMPHOCYTES # BLD AUTO: 2.44 X10(3)/MCL (ref 0.6–4.6)
LYMPHOCYTES NFR BLD AUTO: 32.5 %
MCH RBC QN AUTO: 30 PG (ref 27–31)
MCHC RBC AUTO-ENTMCNC: 34.6 G/DL (ref 33–36)
MCV RBC AUTO: 86.7 FL (ref 80–94)
MONOCYTES # BLD AUTO: 0.73 X10(3)/MCL (ref 0.1–1.3)
MONOCYTES NFR BLD AUTO: 9.7 %
NEUTROPHILS # BLD AUTO: 3.75 X10(3)/MCL (ref 2.1–9.2)
NEUTROPHILS NFR BLD AUTO: 50 %
NRBC BLD AUTO-RTO: 0 %
PLATELET # BLD AUTO: 158 X10(3)/MCL (ref 130–400)
PMV BLD AUTO: 10.7 FL (ref 7.4–10.4)
POTASSIUM SERPL-SCNC: 3.8 MMOL/L (ref 3.5–5.1)
PROT SERPL-MCNC: 7.2 GM/DL (ref 6.4–8.3)
RBC # BLD AUTO: 5.04 X10(6)/MCL (ref 4.7–6.1)
SODIUM SERPL-SCNC: 136 MMOL/L (ref 136–145)
WBC # SPEC AUTO: 7.5 X10(3)/MCL (ref 4.5–11.5)

## 2024-02-22 PROCEDURE — 80053 COMPREHEN METABOLIC PANEL: CPT

## 2024-02-22 PROCEDURE — 86140 C-REACTIVE PROTEIN: CPT

## 2024-02-22 PROCEDURE — 99284 EMERGENCY DEPT VISIT MOD MDM: CPT | Mod: 25

## 2024-02-22 PROCEDURE — 85025 COMPLETE CBC W/AUTO DIFF WBC: CPT

## 2024-02-22 PROCEDURE — 85652 RBC SED RATE AUTOMATED: CPT

## 2024-02-22 RX ORDER — HYDROCODONE BITARTRATE AND ACETAMINOPHEN 7.5; 325 MG/1; MG/1
1 TABLET ORAL EVERY 6 HOURS PRN
Qty: 16 TABLET | Refills: 0 | Status: SHIPPED | OUTPATIENT
Start: 2024-02-22

## 2024-02-23 NOTE — FIRST PROVIDER EVALUATION
"Medical screening examination initiated.  I have conducted a focused provider triage encounter, findings are as follows:    Brief history of present illness:  58 year old male presents to ER with right knee pain and swelling. Patient reports worsening redness and swelling of right knee    Vitals:    02/22/24 1945   BP: (!) 147/88   BP Location: Left arm   Patient Position: Lying   Pulse: 107   Resp: 16   Temp: 98.4 °F (36.9 °C)   TempSrc: Oral   SpO2: 97%   Weight: 124.8 kg (275 lb 2.2 oz)   Height: 5' 7" (1.702 m)       Pertinent physical exam:  awake and alert, nad    Brief workup plan:  Labs, XR    Preliminary workup initiated; this workup will be continued and followed by the physician or advanced practice provider that is assigned to the patient when roomed.  "

## 2024-02-23 NOTE — ED PROVIDER NOTES
Encounter Date: 2/22/2024       History     Chief Complaint   Patient presents with    Knee Pain     Pt. States atraumatic right knee pain x 1 month that is getting worse, pt. States knee is red and swollen, denies fever at home.     See MDM    The history is provided by the patient. No  was used.     Review of patient's allergies indicates:   Allergen Reactions    Doxycycline Swelling     Past Medical History:   Diagnosis Date    Coronary artery disease     Hyperlipidemia      Past Surgical History:   Procedure Laterality Date    CABG x 3       Family History   Problem Relation Age of Onset    Stroke Mother     Heart disease Mother     Hypertension Mother     Hyperlipidemia Mother     Parkinsonism Father      Social History     Tobacco Use    Smoking status: Former    Smokeless tobacco: Never   Substance Use Topics    Alcohol use: Not Currently    Drug use: Never     Review of Systems   Constitutional:  Negative for fever.   Respiratory:  Negative for cough and shortness of breath.    Cardiovascular:  Negative for chest pain.   Gastrointestinal:  Negative for abdominal pain.   Genitourinary:  Negative for difficulty urinating and dysuria.   Musculoskeletal:  Negative for gait problem.   Skin:  Negative for color change.   Neurological:  Negative for dizziness, speech difficulty and headaches.   Psychiatric/Behavioral:  Negative for hallucinations and suicidal ideas.    All other systems reviewed and are negative.      Physical Exam     Initial Vitals [02/22/24 1945]   BP Pulse Resp Temp SpO2   (!) 147/88 107 16 98.4 °F (36.9 °C) 97 %      MAP       --         Physical Exam    Nursing note and vitals reviewed.  Constitutional: He appears well-developed and well-nourished.   HENT:   Head: Normocephalic.   Eyes: EOM are normal.   Neck: Neck supple.   Normal range of motion.  Cardiovascular:  Normal rate, regular rhythm, normal heart sounds and intact distal pulses.           Pulmonary/Chest: Breath  sounds normal.   Abdominal: Abdomen is soft. Bowel sounds are normal.   Musculoskeletal:         General: Normal range of motion.      Cervical back: Normal range of motion and neck supple.      Comments: Tenderness to the right knee with swelling.  Full range of motion.  No redness or warmth noted.     Neurological: He is alert and oriented to person, place, and time. He has normal strength.   Skin: Skin is warm and dry. Capillary refill takes less than 2 seconds.   Psychiatric: He has a normal mood and affect. His behavior is normal. Judgment and thought content normal.         ED Course   Procedures  Labs Reviewed   COMPREHENSIVE METABOLIC PANEL - Abnormal; Notable for the following components:       Result Value    Creatinine 1.43 (*)     All other components within normal limits   C-REACTIVE PROTEIN - Abnormal; Notable for the following components:    C-Reactive Protein 7.00 (*)     All other components within normal limits   CBC WITH DIFFERENTIAL - Abnormal; Notable for the following components:    MPV 10.7 (*)     All other components within normal limits   SEDIMENTATION RATE, AUTOMATED - Normal   CBC W/ AUTO DIFFERENTIAL    Narrative:     The following orders were created for panel order CBC auto differential.  Procedure                               Abnormality         Status                     ---------                               -----------         ------                     CBC with Differential[[id number]]       Abnormal            Final result                 Please view results for these tests on the individual orders.          Imaging Results              X-Ray Knee Complete 4 Or More Views Right (Preliminary result)  Result time 02/22/24 23:26:38      Wet Read by Myles Hathaway FNP (02/22/24 23:26:38, [facility], Emergency Medicine)    Some loss of joint space.  Arthritic changes                                     Medications - No data to display  Medical  Decision Making  Historian:  Patient.  Patient is a 58-year-old male  that presents with right knee pain that has been present 1-2 months. Associated symptoms nothing. Surrounding information is nothing. Exacerbated by nothing. Relieved by nothing. Patient treatment prior to arrival none. Risk factors include none. Other history pertaining to this complaint none.   Assessment:  See physical exam.  DD:  Knee pain, knee strain, knee sprain, arthritis  ED Course: History was obtained.  Physical was performed.  Patient has no redness or warmth to the knee.  He does have some mild swelling with tenderness.  X-ray show some arthritic changes.  Referral sent to [facility]. Medical or surgical consults:  None. Social determinants that affect healthcare:  None.       Amount and/or Complexity of Data Reviewed  Radiology:      Details: Arthritic changes                                      Clinical Impression:  Final diagnoses:  [M25.561] Right knee pain (Primary)          ED Disposition Condition    Discharge Stable          ED Prescriptions       Medication Sig Dispense Start Date End Date Auth. Provider    HYDROcodone-acetaminophen (NORCO) 7.5-325 mg per tablet Take 1 tablet by mouth every 6 (six) hours as needed for Pain. 16 tablet 2/22/2024 -- Myles Hathaway FNP          Follow-up Information       Follow up With Specialties Details Why Contact Info    Follow-up with orthopedics at [facility].    Office will call with follow-up appointment date and time             Myles Hathaway FNP  02/22/24 [id number]

## 2024-03-07 ENCOUNTER — OFFICE VISIT (OUTPATIENT)
Dept: ORTHOPEDICS | Facility: CLINIC | Age: 59
End: 2024-03-07

## 2024-03-07 ENCOUNTER — HOSPITAL ENCOUNTER (OUTPATIENT)
Dept: RADIOLOGY | Facility: HOSPITAL | Age: 59
Discharge: HOME | End: 2024-03-07
Attending: STUDENT IN AN ORGANIZED HEALTH CARE EDUCATION/TRAINING PROGRAM

## 2024-03-07 VITALS
TEMPERATURE: 98 F | HEART RATE: 84 BPM | WEIGHT: 286.19 LBS | DIASTOLIC BLOOD PRESSURE: 77 MMHG | HEIGHT: 67 IN | BODY MASS INDEX: 44.92 KG/M2 | SYSTOLIC BLOOD PRESSURE: 121 MMHG

## 2024-03-07 DIAGNOSIS — E66.01 CLASS 3 SEVERE OBESITY WITH BODY MASS INDEX (BMI) OF 40.0 TO 44.9 IN ADULT, UNSPECIFIED OBESITY TYPE, UNSPECIFIED WHETHER SERIOUS COMORBIDITY PRESENT: ICD-10-CM

## 2024-03-07 DIAGNOSIS — M17.11 PRIMARY OSTEOARTHRITIS OF RIGHT KNEE: Primary | ICD-10-CM

## 2024-03-07 DIAGNOSIS — M25.561 RIGHT KNEE PAIN: ICD-10-CM

## 2024-03-07 PROCEDURE — 73564 X-RAY EXAM KNEE 4 OR MORE: CPT | Mod: TC,RT

## 2024-03-07 PROCEDURE — 99215 OFFICE O/P EST HI 40 MIN: CPT | Mod: PBBFAC,25

## 2024-03-07 PROCEDURE — 20610 DRAIN/INJ JOINT/BURSA W/O US: CPT | Mod: PBBFAC

## 2024-03-07 RX ORDER — TRIAMCINOLONE ACETONIDE 40 MG/ML
40 INJECTION, SUSPENSION INTRA-ARTICULAR; INTRAMUSCULAR ONCE
Status: COMPLETED | OUTPATIENT
Start: 2024-03-07 | End: 2024-03-07

## 2024-03-07 RX ORDER — MELOXICAM 15 MG/1
15 TABLET ORAL DAILY PRN
COMMUNITY
Start: 2024-02-19

## 2024-03-07 RX ORDER — LIDOCAINE HYDROCHLORIDE 10 MG/ML
5 INJECTION, SOLUTION EPIDURAL; INFILTRATION; INTRACAUDAL; PERINEURAL
Status: COMPLETED | OUTPATIENT
Start: 2024-03-07 | End: 2024-03-07

## 2024-03-07 RX ORDER — DICLOFENAC SODIUM 10 MG/G
2 GEL TOPICAL 4 TIMES DAILY PRN
Qty: 100 G | Refills: 3 | Status: SHIPPED | OUTPATIENT
Start: 2024-03-07 | End: 2024-06-05

## 2024-03-07 RX ADMIN — TRIAMCINOLONE ACETONIDE 40 MG: 40 INJECTION, SUSPENSION INTRA-ARTICULAR; INTRAMUSCULAR at 10:03

## 2024-03-07 RX ADMIN — LIDOCAINE HYDROCHLORIDE 50 MG: 10 INJECTION, SOLUTION EPIDURAL; INFILTRATION; INTRACAUDAL; PERINEURAL at 10:03

## 2024-03-07 NOTE — PROGRESS NOTES
"  Subjective:    Patient ID: Huseyin Sheth is a 58 y.o. male  who presented to [facility] for new visit.    Chief Complaint: Pain of the Right Knee    History of Present Illness:  Huseyin Sheth presents with chronic atraumatic knee pain onset 4 months ago. States pain currently 4/10 but 10/10 by end of the day. Pain described as throbbing, initially intermittent but in the last 2 weeks became constant. Associated swelling by the end of the day. Denies any locking, buckling, giving way, popping. Pain worsens with walking and standing. Patient states nothing improves his pain. Treatment to date includes tylenol, Mobic, biofreeze, aspercreme, icing, topical CBD. Occupation: offshore, prolongs standing on concrete. ADL are not limited. Goals for today's visit is relief of pain.     Knee Review of Systems:  Swelling?  yes  Instability?  no  Mechanical sx?  no  <30 min AM stiffness? no  Limited ROM? yes  Fever/Chills? no     Objective:      Physical Exam:  /77   Pulse 84   Temp 97.8 °F (36.6 °C)   Ht 5' 7" (1.702 m)   Wt 129.8 kg (286 lb 3.2 oz)   BMI 44.83 kg/m²     Appearance:  antalgic  FWB  Alignment: Left: normal Right: normal   Soft tissue swelling: Left: no Right: no  Effusion: Left:  Negative Right: Negative  Erythema: Left no Right: no  Ecchymosis: Left: no Right: no  Atrophy: Left: no Right: no    Palpation:  Knee Tenderness: Left: None Right: Medial joint line    Range of motion:  Flexion (140): Left:  110 Right: 100  Extension (0): Left: 0 Right: 0    Strength:  Extension: Left 5/5  Pain: no     Right 5/5 Pain: yes  Flexion: Left 5/5 Pain: no Right   5/5 Pain: yes    Special Tests:  Ballotable Effusion:Left: Negative Right: Negative   Fluid Wave: Left: Negative Right: Positive   Crepitus: Left: Positive Right: Positive   Patellar grind test: Left: Negative  Right: Positive  Apprehension test: Left: Negative Right: Negative   Varus: @ 0, Left Negative " Right: Negative.  @ 30, Left Negative  Right Negative   Valgus: @ 0, Left Negative Right: Positive.  @ 30, Left Negative  Right Positive  Lachman: Left: Negative Right: Negative   Ant Drawer: Left: Negative Right: Negative   Posterior Drawer: Left: Negative Right: Negative   Dial Test: Left: Not performed Right: Not performed   Van: Left: Negative Right: Positive medially  Apley's: Left: Not performed Right: Not performed  Thessaly's: Left: Negative Right: Positive   Noble Compression: Left: Not performed Right: Not performed   Jaydon: Left: Not performed Right: Not performed     General appearance: NAD  Peripheral pulses: normal bilaterally   Reflexes: Left: normal Right normal   Sensation: normal    Labs:  Last A1c: The patient doesn't have any registry metric data available     Imaging:   Previous images reviewed.  X-rays ordered and performed today: yes  # of views: 4 Laterality: right  My Interpretation:   mild joint space narrowing in the medial compartment with sclerosis. No fracture or dislocation.     Assessment:      Encounter Diagnoses   Code Name Primary?    M17.11 Primary osteoarthritis of right knee Yes    E66.01, Z68.41 Class 3 severe obesity with body mass index (BMI) of 40.0 to 44.9 in adult, unspecified obesity type, unspecified whether serious comorbidity present       Plan:    MDM: Prior external referring provider notes reviewed. Prior external referring provider studies reviewed.   Dx: right Knee Osteoarthritis.    Treatment Plan: Discussed with patient diagnosis, prognosis, and treatment recommendations. Education provided.  While structural injuries of the knee cannot be ruled out. MRI will be deferred at this time due to patient being self pay. Patient still has ability to go to work and can meet all ADL. Today will manage patient's pain until he gets approval for financial aid assistance. Discussed treatment options we can provide today include physical therapy and CSI although patient  will be paying out of pocket if he has interest in receiving one. Patient understands the financial consequences of receiving CSI and consents to procedure.    Imaging: radiological studies ordered and independently reviewed; discussed with patient; pending radiologist interpretation.   Weight Management: is paramount. recommend at least 10% of total body weight loss if your bmi is 30-34.9. A bmi 24.9 or less may provide further relief..   Procedure: Discussed CSI/VSI as treatment options; discussed CSI vs VS injections as treatment options; since conservative measures did not improve symptoms patient consented for CSI today.  Activity: Activity as tolerated; HEP to include aerobic conditioning and strength training with non-painful activity. ROM/STG exercises. Proper footware; assistive devises to avoid limping.   Therapy: Physical Therapy  Medication: START over-the-counter acetaminophen (Tylenol 1000 mg three times per day as needed)  START Voltaren Gel 1% as prescribed to affected area. Continue Tylenol as needed. Please see your primary care physician for further refills.  RTC: RTC prn, call to make an appointment      Large Joint Aspiration/Injection: R knee    Date/Time: 3/7/2024 8:30 AM    Performed by: Dar Valles MD  Authorized by: Dar Valles MD    Consent Done?:  Yes (Written)  Indications:  Arthritis and pain  Site marked: the procedure site was marked    Timeout: prior to procedure the correct patient, procedure, and site was verified    Prep: patient was prepped and draped in usual sterile fashion      Local anesthesia used?: Yes    Local anesthetic:  Topical anesthetic    Details:  Needle Size:  21 G  Approach:  Anterolateral  Location:  Knee  Site:  R knee  Patient tolerance:  Patient tolerated the procedure well with no immediate complications     Staff: Irwin Pearson MD    Risks:  Possible complications with the injection include bleeding, infection (.01%), tendon rupture, steroid flare, fat  pad or soft tissue atrophy, skin depigmentation, allergic reaction to medications and vasovagal response. (steroid flare treatment is rest, ice, NSAIDs and resolves in 24-36 hours.)    Consent:  No absolute contraindications (cellulitis overlying joint, infection, lack of informed consent, allergy to injection medication, AVN protein or egg allergy for sodium hyaluronate, or history of steroid flare) or relative contraindications (uncontrolled DM2 A1c>10, coagulopathy, INR > 3.5, previous joint replacement or history of AVN).        Description:  The patient was prepped in normal sterile fashion use of chlorhexidine scrub and the appropriate and anatomic landmarks were identified without ultrasound.  Contents of syringe included: 5cc of 1% of lidocaine with 40mg of Kenalog     Post Procedure: Patient alert, and moving all extremities. ROM improved, pain decreased.  Good peripheral pulses, no signs of vascular compromise and range of motion intact.  Aftercare instructions were given to patient at time of discharge.  Relative rest for 3 days-avoiding excess activity.  Place ice on the area for 15 minutes every 4-6 hours. Patient may take Tylenol a 1000 mg b.i.d. or ibuprofen 600 mg t.i.d. for the next 3-4 days if not on medication already and safe to take pending co-morbidities.  Protect the area for the next 1-8 hours if anesthetic was used.  Avoid excessive activity for the next 3-4 weeks.  ER precautions given for fever, severe joint pain or allergic reaction or other new symptoms related to the joint injection.

## 2024-12-17 ENCOUNTER — OFFICE VISIT (OUTPATIENT)
Dept: CARDIOLOGY | Facility: CLINIC | Age: 59
End: 2024-12-17

## 2024-12-17 VITALS
BODY MASS INDEX: 44.36 KG/M2 | TEMPERATURE: 98 F | OXYGEN SATURATION: 98 % | DIASTOLIC BLOOD PRESSURE: 82 MMHG | WEIGHT: 282.63 LBS | HEIGHT: 67 IN | RESPIRATION RATE: 20 BRPM | SYSTOLIC BLOOD PRESSURE: 121 MMHG | HEART RATE: 66 BPM

## 2024-12-17 DIAGNOSIS — E78.2 MIXED HYPERLIPIDEMIA: Primary | ICD-10-CM

## 2024-12-17 DIAGNOSIS — I25.10 CORONARY ARTERY DISEASE, UNSPECIFIED VESSEL OR LESION TYPE, UNSPECIFIED WHETHER ANGINA PRESENT, UNSPECIFIED WHETHER NATIVE OR TRANSPLANTED HEART: ICD-10-CM

## 2024-12-17 DIAGNOSIS — I10 HTN (HYPERTENSION), BENIGN: ICD-10-CM

## 2024-12-17 PROCEDURE — 99215 OFFICE O/P EST HI 40 MIN: CPT | Mod: PBBFAC | Performed by: INTERNAL MEDICINE

## 2024-12-17 RX ORDER — ASPIRIN 81 MG/1
81 TABLET ORAL DAILY
COMMUNITY

## 2024-12-17 RX ORDER — BUDESONIDE, GLYCOPYRROLATE, AND FORMOTEROL FUMARATE 160; 9; 4.8 UG/1; UG/1; UG/1
2 AEROSOL, METERED RESPIRATORY (INHALATION) 2 TIMES DAILY
COMMUNITY

## 2024-12-17 NOTE — PROGRESS NOTES
"Cardiology Attending  12/17/2024 4:21 PM    I evaluated Huseyin Sheth in Cardiology Clinic and discussed the patient's symptoms, findings, and management plan with the resident.   Mr. Huseyin Sheth is a 59 y.o. male.  The patient is seen in cardiology clinic for CAD, HTN, HLD, bradycardia.   Blood pressure 121/82, pulse 66, temperature 98.1 °F (36.7 °C), temperature source Oral, resp. rate 20, height 5' 7" (1.702 m), weight 128.2 kg (282 lb 10.1 oz), SpO2 98%.  The patient is pleasant.    Patient appears to do 3 METS (he can walk from parking lot to clinic)    The patient is on aspirin and plavix for CAD    Lab Results   Component Value Date    CHOL 139 04/10/2023      Lab Results   Component Value Date    HGB 15.1 02/22/2024      Lab Results   Component Value Date    CREATININE 1.43 (H) 02/22/2024      No results found for: "BNP"       PREOP CARDIAC RISK ASSESSMENT   Antiplatelets:  The patient is at low to moderate cardiac risk for holding Plavix for 5 days prior to the procedure, and moderate risk for holding both Plavix and aspirin for 5 days prior to the procedure.      Cardiac risk:  The patient is at low to moderate cardiac risk for a low risk procedure as long as general anesthesia is not used.     FOLLOW UP:  4 months    Robin Dave MD   "

## 2024-12-17 NOTE — PROGRESS NOTES
"        CHIEF COMPLAINT:   Chief Complaint   Patient presents with    f/u denies chest pain or sob since LV needs clearance for d                   Review of patient's allergies indicates:   Allergen Reactions    Doxycycline Swelling                                          HPI:  Huseyni Sheth 59 y.o.  with HTN, HLD, Multivessel CAD with multiple PCI in 2006, 2014, 2017, CABG 2020,  Hx PE (s/t surgery), who presents today for routine follow up and ongoing care.  The patient completed a carotid ultrasound on 12.1.22 that demonstrated no hemodynamically significant stenosis to bilateral internal carotid arteries.  He had an echocardiogram in January 2022 that revealed intact EF 55-60%. (See report below).  SULTANA testing in October 2021 revealed no evidence of significant arterial insufficiency.      Patient doing well. Complains of stable VIVEROS, able to walk ambulate from clinic to vehicle, but states unable to do on"hot days". Unsure why he has remained on Plavix and ASA.  Awaiting tooth extraction, here additionally for risk assessment for this.     Cardiac Testing   ECHO 1.20.22  Left ventricular ejection fraction is measured at approximately 55-60%   Structurally normal mitral valve  Mild posteriorly directed mitral regurgitation   Structurally normal trileaflet aortic valve   Tricuspid valve is structurally normal   Trace tricuspid regurgitation   Pulmonary  arterial systolic pressure is estimated to be normal (<36 mmHG)  Trace pulmonic regurgitation present   Mildly dilated left atrium  Normal right atrial size  Normal right ventricular chamber size and function                                                                                                                                                                                                                                                                                                                                                                  "                                                                                                                                                                                                                         Patient Active Problem List   Diagnosis    Coronary artery disease involving native coronary artery of native heart without angina pectoris    HTN (hypertension), benign    Mixed hyperlipidemia    Obstructive sleep apnea    Dyspnea     Past Surgical History:   Procedure Laterality Date    CABG x 3      PLACEMENT-STENT      x2     Social History     Socioeconomic History    Marital status:    Tobacco Use    Smoking status: Former    Smokeless tobacco: Never   Substance and Sexual Activity    Alcohol use: Not Currently    Drug use: Never        Family History   Problem Relation Name Age of Onset    Stroke Mother      Heart disease Mother      Hypertension Mother      Hyperlipidemia Mother      Parkinsonism Father           Current Outpatient Medications:     albuterol (PROVENTIL/VENTOLIN HFA) 90 mcg/actuation inhaler, Inhale 90 puffs into the lungs as needed., Disp: , Rfl:     amitriptyline (ELAVIL) 50 MG tablet, Take 50 mg by mouth every evening., Disp: , Rfl:     aspirin (ECOTRIN) 81 MG EC tablet, Take 81 mg by mouth once daily., Disp: , Rfl:     budesonide-glycopyr-formoterol (BREZTRI AEROSPHERE) 160-9-4.8 mcg/actuation HFAA, Inhale 2 puffs into the lungs 2 (two) times daily., Disp: , Rfl:     citalopram (CELEXA) 40 MG tablet, Take 40 mg by mouth once daily., Disp: , Rfl:     clopidogreL (PLAVIX) 75 mg tablet, Take 1 tablet (75 mg total) by mouth once daily., Disp: 90 tablet, Rfl: 3    isosorbide mononitrate (IMDUR) 30 MG 24 hr tablet, Take 1 tablet (30 mg total) by mouth once daily., Disp: 90 tablet, Rfl: 3    melatonin 5 mg Cap, Take 10 mg by mouth every evening., Disp: , Rfl:     meloxicam (MOBIC) 15 MG tablet, Take 15 mg by mouth daily as needed., Disp: , Rfl:     metoprolol succinate  (TOPROL-XL) 25 MG 24 hr tablet, Take 0.5 tablets (12.5 mg total) by mouth once daily., Disp: 45 tablet, Rfl: 3    nitroGLYCERIN (NITROSTAT) 0.4 MG SL tablet, Place 1 tablet (0.4 mg total) under the tongue as needed for Chest pain., Disp: 25 tablet, Rfl: 3    omeprazole (PRILOSEC) 40 MG capsule, Take 40 mg by mouth once daily., Disp: , Rfl:     rosuvastatin (CRESTOR) 40 MG Tab, Take 1 tablet (40 mg total) by mouth once daily., Disp: 90 tablet, Rfl: 3    tamsulosin (FLOMAX) 0.4 mg Cap, Take 1 capsule by mouth once daily., Disp: , Rfl:     busPIRone (BUSPAR) 5 MG Tab, Take 5 mg by mouth 3 (three) times daily. (Patient not taking: Reported on 12/17/2024), Disp: , Rfl:     diclofenac sodium (VOLTAREN) 1 % Gel, Apply 2 g topically 4 (four) times daily as needed (pain). Do not exceed 32 grams/day: do not to exceed 8 grams/day/single joint of upper extremities; do not to exceed 16 grams/day/single joint of lower extremities.  Please request refill of this medication from your PCP., Disp: 100 g, Rfl: 3    HYDROcodone-acetaminophen (NORCO) 7.5-325 mg per tablet, Take 1 tablet by mouth every 6 (six) hours as needed for Pain. (Patient not taking: Reported on 12/17/2024), Disp: 16 tablet, Rfl: 0    omeprazole (PRILOSEC) 20 MG capsule, Take 20 mg by mouth as needed. (Patient not taking: Reported on 12/17/2024), Disp: , Rfl:      ROS:                                                                                                                                                                             Review of Systems   Constitutional: Negative.    HENT: Negative.     Eyes: Negative.    Respiratory:  Positive for shortness of breath.    Cardiovascular: Negative.    Gastrointestinal: Negative.    Genitourinary: Negative.    Musculoskeletal: Negative.    Skin: Negative.    Endo/Heme/Allergies: Negative.    Psychiatric/Behavioral: Negative.          Blood pressure 121/82, pulse 66, temperature 98.1 °F (36.7 °C), temperature source  "Oral, resp. rate 20, height 5' 7" (1.702 m), weight 128.2 kg (282 lb 10.1 oz), SpO2 98%.   PE:  Physical Exam  Constitutional:       Appearance: Normal appearance.   HENT:      Head: Normocephalic.   Eyes:      Pupils: Pupils are equal, round, and reactive to light.   Cardiovascular:      Rate and Rhythm: Normal rate and regular rhythm.      Pulses: Normal pulses.   Pulmonary:      Effort: Pulmonary effort is normal.      Comments: Faint crackles noted bibasilar lung fields.   Musculoskeletal:         General: Normal range of motion.      Right lower leg: Edema (trace BLE edema. nonpitting.) present.      Left lower leg: Edema present.   Skin:     General: Skin is warm and dry.   Neurological:      General: No focal deficit present.      Mental Status: He is alert and oriented to person, place, and time.   Psychiatric:         Mood and Affect: Mood normal.         Behavior: Behavior normal.                ASSESSMENT/PLAN:    Pre-Op Risk Assessment (Planned tooth extraction)  -Patient is a low-moderate risk candidate for a low risk procedure;   -Patient is not cleared for general anesthesia (low-moderate risk for Plavix hold, moderate risk for ASA hold)  -Instructed to hold ASA and Plavix for 5 days prior to tooth extract, may restart day after    Coronary artery disease   - s/p CABG x 3 (Oct. 2020)  - Multiple PCI 2006, 2014, 2017  - Denies CP. Reports stable VIVEROS  - Continue Aspirin 81 mg, Plavix 75 mg daily, Imdur 30 mg daily, Crestor 40 mg daily, metoprolol succinate 12.5 mg daily, and SL Nitro prn CP.   - Patient remains on DAPT; told by CABG performing surgeon to remain on life, can reassess at next visit  - Counseled on heart healthy diet and exercise as tolerated     Bradycardia   - Continue metoprolol succinate 12.5 mg q.day. unable to tolerate higher doses due to dizziness    COPD  VIVEROS - unchanged  - EF 55-60% (1/20/2022)  - Report stable VIVEROS.   - PFT 5/23/24 consistent with moderately severe obstruction, no " response to bronchodilator     L Leg pain  - Reports intermittent Left leg burning   -  SULTANA testing Oct. 2021 that revealed no evidence of arterial insufficiency with triphasic waveforms throughout.  - may be neuropathy related, will defer the PCP       Hypertension - BP at goal   - Continue current medicatios   - Counseled on low-sodium diet    HLD  - LDL- 70- at goal   - Continue Crestor  40 mg po daily  -Repeat lipid panel prior to next visit  - Counseled patient on low-cholesterol, low-fat diet, and encourage exercise as tolerated    Single subsegmental pulmonary embolism without acute cor pulmonale, provoked s/t surgery  - Xarelto previously discontinued after completion of 3 months of therapy    Obesity   - Counseled on weight loss through diet and exercise    HX of tobacco use  - Smoked 2 PPD x 40 years  - Quit 2020. Counseled on continued smoking cessation     Sleep apnea - CPAP  - Reports nightly compliance with CPAP         RTC in 4 months for follow up      Dedrick Krishnamurthy MD  Internal Medicine - PGY-3  [facility] - Cardiology Clinic

## 2025-01-08 ENCOUNTER — OFFICE VISIT (OUTPATIENT)
Dept: ORTHOPEDICS | Facility: CLINIC | Age: 60
End: 2025-01-08

## 2025-01-08 VITALS
WEIGHT: 286.63 LBS | SYSTOLIC BLOOD PRESSURE: 110 MMHG | HEIGHT: 67 IN | DIASTOLIC BLOOD PRESSURE: 71 MMHG | HEART RATE: 86 BPM | BODY MASS INDEX: 44.99 KG/M2

## 2025-01-08 DIAGNOSIS — M17.11 PRIMARY OSTEOARTHRITIS OF RIGHT KNEE: Primary | ICD-10-CM

## 2025-01-08 PROCEDURE — 20610 DRAIN/INJ JOINT/BURSA W/O US: CPT | Mod: PBBFAC

## 2025-01-08 PROCEDURE — 20610 DRAIN/INJ JOINT/BURSA W/O US: CPT | Mod: PBBFAC,RT

## 2025-01-08 PROCEDURE — 99214 OFFICE O/P EST MOD 30 MIN: CPT | Mod: PBBFAC

## 2025-01-08 RX ORDER — LIDOCAINE HYDROCHLORIDE 10 MG/ML
5 INJECTION, SOLUTION EPIDURAL; INFILTRATION; INTRACAUDAL; PERINEURAL
Status: DISPENSED | OUTPATIENT
Start: 2025-01-08

## 2025-01-08 RX ORDER — TRIAMCINOLONE ACETONIDE 40 MG/ML
40 INJECTION, SUSPENSION INTRA-ARTICULAR; INTRAMUSCULAR ONCE
Status: DISPENSED | OUTPATIENT
Start: 2025-01-08

## 2025-01-08 NOTE — PROGRESS NOTES
"Subjective:    Patient ID: Huseyin Sheth is a 59 y.o. male  who presented to [facility] for follow up.    Chief Complaint: Pain of the Right Knee      History of Present Illness:  Huseyin Sheth presented today with with knee pain involving the right knee for the past month. Pain is located anteriorly. Quality of pain is described as Sharp and Throbbing. Pain 7/10 today. Minimum 1/10, maximum 10/10. Inciting event: none known. Pain is aggravated by walking.  Patient has had prior knee problems. Previously diagnosed with right knee osteoarthritis. Evaluation to date: plain films, PCP evaluation, and Ortho evaluation. Treatment to date: topical analgesics, CSI, and ice/heat. Last CSI of right knee 3/7/2024. Provided relief for about 9 months. Expectations for today's visit includes CSI.  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 PCP is Meredith Guillermo.    Knee Review of Systems:  Swelling?  no  Instability?  no  Mechanical sx?  no  <30 min AM stiffness? no  Limited ROM? no  Fever/Chills? no    Comorbid Conditions:  HTN  CAD         Objective:      Physical Exam:    /71   Pulse 86   Ht 5' 7" (1.702 m)   Wt 130 kg (286 lb 9.6 oz)   BMI 44.89 kg/m²     Ortho/SPM Exam    Appearance:  antalgic  FWB  Alignment: Left: mild varus Right: mild varus   Soft tissue swelling: Left: no Right: no  Effusion: Left:  Negative Right: Positive  Erythema: Left no Right: no  Ecchymosis: Left: no Right: no  Atrophy: Left: yes Right: yes - quadriceps    Palpation:  Knee Tenderness: Left: None Right: Medial joint line, Lateral joint line, and Patella    Range of motion:  Flexion (140): Left:  120 Right: 120  Extension (0): Left: 0 Right: 0    Strength:  Extension: Left 5/5  Pain: no     Right 5/5 Pain: no  Flexion: Left 5/5 Pain: no Right   5/5 Pain: no        Special Tests:  Ballotable Effusion:Left: Negative Right: " Positive   Fluid Wave: Left: Negative Right: Negative   Crepitus: Left: Negative Right: Positive   Patellar grind test: Left: Negative  Right: Negative  Apprehension test: Left: Not performed Right: Not performed   Varus: @ 0, Left Negative Right: Positive.  @ 30, Left Negative  Right Positive   Valgus: @ 0, Left Negative Right: Positive.  @ 30, Left Negative  Right Positive  Lachman: Left: Not performed Right: Not performed   Ant Drawer: Left: Negative Right: Negative   Posterior Drawer: Left: Negative Right: Negative   Dial Test: Left: Not performed Right: Not performed   Van: Left: Not performed Right: Not performed   Apley's: Left: Not performed Right: Not performed  Thessaly's: Left: Negative Right: Negative   Noble Compression: Left: Not performed Right: Not performed   Jaydon: Left: Not performed Right: Not performed       General appearance: NAD  Peripheral pulses: normal bilaterally   Reflexes: Left: not performed Right not performed   Sensation: normal    Labs:  Last A1c: The patient doesn't have any registry metric data available     Imaging:   Previous images reviewed.  X-rays ordered and performed today: no  # of views: 4 Laterality: right  My Interpretation: degenerative changes - medial joint space narrowing and osteophyte formation; KL grade 3        Assessment:        Encounter Diagnoses   Code Name Primary?    M17.11 Primary osteoarthritis of right knee Yes        Plan:           Dx: primary right knee osteoarthritis - chronic in acute, moderate exacerbation  Treatment Plan: Discussed with patient diagnosis and treatment recommendations. Education provided. Handout given. Recommend conservative treatment to include: avoidance of aggravating activity, significant modification of daily activities, hot/cold therapies, topical and oral medications, braces, HEP/PT/OT, and injections.   Imaging: radiological studies ordered and independently reviewed; discussed with patient; pending radiologist  interpretation.   Weight Management: is paramount. Recommend to discuss with PCP about medication and bariatric surgery options for weight loss if your BMI is >35 and applicable. A BMI of <24.9 may provide further relief.  Procedure: Discussed injections as treatment options; discussed injections as treatment options; since conservative measures did not improve symptoms patient consented for CSI today.  Activity: Activity as tolerated; HEP to include aerobic conditioning and strength training with non-painful activity. ROM/STG exercises. Proper footware; assistive devises to avoid limping.   Therapy: No formal therapy; patient not desiring formal PT at this time. Provided with HEP handout.   Medication: START over-the-counter acetaminophen (Tylenol 1000 mg three times per day as needed).   RTC: 6 months             Large Joint Aspiration/Injection: R knee    Date/Time: 1/8/2025 10:30 AM    Performed by: Stacy Henley MD  Authorized by: Kashmir Veras MD    Consent Done?:  Yes (Written)  Indications:  Arthritis and pain  Site marked: the procedure site was marked    Prep: patient was prepped and draped in usual sterile fashion      Local anesthesia used?: Yes    Local anesthetic:  Topical anesthetic    Details:  Needle Size:  21 G  Ultrasonic Guidance for needle placement?: No    Approach:  Anterolateral  Location:  Knee  Site:  R knee  Patient tolerance:  Patient tolerated the procedure well with no immediate complications     Staff: Kashmir Veras MD    Risks:  Possible complications with the injection include bleeding, infection (.01%), tendon rupture, steroid flare, fat pad or soft tissue atrophy, skin depigmentation, allergic reaction to medications and vasovagal response. (steroid flare treatment is rest, ice, NSAIDs and resolves in 24-36 hours.)    Consent:  No absolute contraindications (cellulitis overlying joint, infection, lack of informed consent, allergy to injection medication, AVN protein or egg  allergy for sodium hyaluronate, or history of steroid flare) or relative contraindications (uncontrolled DM2 A1c>10, coagulopathy, INR > 3.5, previous joint replacement or history of AVN).        Description:  The patient was prepped in normal sterile fashion use of chlorhexidine scrub and the appropriate and anatomic landmarks were identified without ultrasound.  Contents of syringe included: 5cc of 1% of lidocaine with 40mg of Kenalog     Post Procedure: Patient alert, and moving all extremities. ROM improved, pain decreased.  Good peripheral pulses, no signs of vascular compromise and range of motion intact.  Aftercare instructions were given to patient at time of discharge.  Relative rest for 3 days-avoiding excess activity.  Place ice on the area for 15 minutes every 4-6 hours. Patient may take Tylenol a 1000 mg b.i.d. or ibuprofen 600 mg t.i.d. for the next 3-4 days if not on medication already and safe to take pending co-morbidities.  Protect the area for the next 1-8 hours if anesthetic was used.  Avoid excessive activity for the next 3-4 weeks.  ER precautions given for fever, severe joint pain or allergic reaction or other new symptoms related to the joint injection.             Stacy Henley MD  \[facility]\"" Family Medicine, HO-2

## 2025-01-08 NOTE — PROGRESS NOTES
Faculty Attestation: Huseyin Healyil  was seen in Sports Medicine Clinic Patient seen and evaluated at the time of the visit. History of Present Illness, Physical Exam, and Assessment and Plan reviewed.     Treatment plan is reasonable and appropriate. Compliance with treatment recommendations is important.      No imaging studies were done today.     Procedure note reviewed. Present for entire procedure with the resident. Patient tolerated procedure well.     Kashmir Veras MD  Sports Medicine

## 2025-03-14 ENCOUNTER — LAB VISIT (OUTPATIENT)
Dept: LAB | Facility: HOSPITAL | Age: 60
End: 2025-03-14

## 2025-03-14 DIAGNOSIS — I10 HTN (HYPERTENSION), BENIGN: ICD-10-CM

## 2025-03-14 DIAGNOSIS — E78.2 MIXED HYPERLIPIDEMIA: ICD-10-CM

## 2025-03-14 LAB
ANION GAP SERPL CALC-SCNC: 9 MEQ/L
BUN SERPL-MCNC: 14.1 MG/DL (ref 8.4–25.7)
CALCIUM SERPL-MCNC: 9.1 MG/DL (ref 8.4–10.2)
CHLORIDE SERPL-SCNC: 106 MMOL/L (ref 98–107)
CHOLEST SERPL-MCNC: 148 MG/DL
CHOLEST/HDLC SERPL: 3 {RATIO} (ref 0–5)
CO2 SERPL-SCNC: 21 MMOL/L (ref 22–29)
CREAT SERPL-MCNC: 1.34 MG/DL (ref 0.72–1.25)
CREAT/UREA NIT SERPL: 11
GFR SERPLBLD CREATININE-BSD FMLA CKD-EPI: >60 ML/MIN/1.73/M2
GLUCOSE SERPL-MCNC: 83 MG/DL (ref 74–100)
HDLC SERPL-MCNC: 45 MG/DL (ref 35–60)
LDLC SERPL CALC-MCNC: 71 MG/DL (ref 50–140)
POTASSIUM SERPL-SCNC: 3.8 MMOL/L (ref 3.5–5.1)
SODIUM SERPL-SCNC: 136 MMOL/L (ref 136–145)
TRIGL SERPL-MCNC: 162 MG/DL (ref 34–140)
VLDLC SERPL CALC-MCNC: 32 MG/DL

## 2025-03-14 PROCEDURE — 80048 BASIC METABOLIC PNL TOTAL CA: CPT

## 2025-03-14 PROCEDURE — 36415 COLL VENOUS BLD VENIPUNCTURE: CPT

## 2025-03-14 PROCEDURE — 80061 LIPID PANEL: CPT

## 2025-04-17 ENCOUNTER — OFFICE VISIT (OUTPATIENT)
Dept: CARDIOLOGY | Facility: CLINIC | Age: 60
End: 2025-04-17

## 2025-04-17 VITALS
HEIGHT: 67 IN | HEART RATE: 62 BPM | BODY MASS INDEX: 43.57 KG/M2 | TEMPERATURE: 98 F | WEIGHT: 277.63 LBS | OXYGEN SATURATION: 96 % | RESPIRATION RATE: 20 BRPM | DIASTOLIC BLOOD PRESSURE: 79 MMHG | SYSTOLIC BLOOD PRESSURE: 115 MMHG

## 2025-04-17 DIAGNOSIS — I25.10 CORONARY ARTERY DISEASE INVOLVING NATIVE CORONARY ARTERY OF NATIVE HEART WITHOUT ANGINA PECTORIS: ICD-10-CM

## 2025-04-17 DIAGNOSIS — J44.1 COPD EXACERBATION: Primary | ICD-10-CM

## 2025-04-17 DIAGNOSIS — I25.10 CORONARY ARTERY DISEASE, UNSPECIFIED VESSEL OR LESION TYPE, UNSPECIFIED WHETHER ANGINA PRESENT, UNSPECIFIED WHETHER NATIVE OR TRANSPLANTED HEART: ICD-10-CM

## 2025-04-17 DIAGNOSIS — E78.2 MIXED HYPERLIPIDEMIA: ICD-10-CM

## 2025-04-17 DIAGNOSIS — R61 DIAPHORESIS: ICD-10-CM

## 2025-04-17 PROCEDURE — 99214 OFFICE O/P EST MOD 30 MIN: CPT | Mod: PBBFAC | Performed by: INTERNAL MEDICINE

## 2025-04-17 RX ORDER — ROSUVASTATIN CALCIUM 40 MG/1
40 TABLET, COATED ORAL DAILY
Qty: 90 TABLET | Refills: 3 | Status: SHIPPED | OUTPATIENT
Start: 2025-04-17 | End: 2026-04-17

## 2025-04-17 RX ORDER — CLOPIDOGREL BISULFATE 75 MG/1
75 TABLET ORAL DAILY
Qty: 90 TABLET | Refills: 3 | Status: SHIPPED | OUTPATIENT
Start: 2025-04-17 | End: 2026-04-17

## 2025-04-17 RX ORDER — ASPIRIN 81 MG/1
81 TABLET ORAL DAILY
Qty: 90 TABLET | Refills: 3 | Status: SHIPPED | OUTPATIENT
Start: 2025-04-17 | End: 2026-04-17

## 2025-04-17 RX ORDER — ISOSORBIDE MONONITRATE 30 MG/1
30 TABLET, EXTENDED RELEASE ORAL DAILY
Qty: 90 TABLET | Refills: 3 | Status: SHIPPED | OUTPATIENT
Start: 2025-04-17 | End: 2026-04-17

## 2025-04-17 RX ORDER — NITROGLYCERIN 0.4 MG/1
0.4 TABLET SUBLINGUAL
Qty: 25 TABLET | Refills: 3 | Status: SHIPPED | OUTPATIENT
Start: 2025-04-17 | End: 2026-04-17

## 2025-04-17 RX ORDER — METOPROLOL SUCCINATE 25 MG/1
12.5 TABLET, EXTENDED RELEASE ORAL DAILY
Qty: 45 TABLET | Refills: 3 | Status: SHIPPED | OUTPATIENT
Start: 2025-04-17 | End: 2026-04-17

## 2025-04-17 NOTE — PROGRESS NOTES
CHIEF COMPLAINT:   Chief Complaint   Patient presents with    f/u denies chest pain since LV has SOB/COPD no questions                    Review of patient's allergies indicates:   Allergen Reactions    Doxycycline Swelling                                          HPI:  Huseyin Sheth 59 y.o.  with HTN, HLD, Multivessel CAD with multiple PCI in 2006, 2014, 2017, CABG 2020,  Hx PE (s/t surgery), who presents today for routine follow up and ongoing care.  The patient completed a carotid ultrasound on 12.1.22 that demonstrated no hemodynamically significant stenosis to bilateral internal carotid arteries.  He had an echocardiogram in January 2022 that revealed intact EF 55-60%. (See report below).  SULTANA testing in October 2021 revealed no evidence of significant arterial insufficiency.      Interval Hx (04/17/2025):  Patient has no complaints today, reports mildly increased VIVEROS for past 3 months, that improves with albuterol. Patient also has been having x1 episode per week of diaphoresis when he wakes up from nap, and had lightheadedness during these episodes. Patient checked BG at that time and was normal, but improved after eating sweet snack usually. Patient also had x1 episode of hypertension at work that made him come to ED, and his BP normalized by by arrival. Patient unable to remember numbers but remembers that diastolic was high. Otherwise denies chest pain at rest or exertion, no orthopnea/bendopnea/lower extremity swelling. Able to do all ADLs with no issue, but does get SOB at work. Able to walk 2 blocks without dyspnea. The patient denies headaches, changes in vision, nausea, emesis, fevers, chills, chest pain, palpitations, dyspnea, abdominal pain, or changes in urinary or bowel habits.       Cardiac Testing   ECHO 1.20.22  Left ventricular ejection fraction is measured at approximately 55-60%   Structurally normal mitral valve  Mild posteriorly directed mitral regurgitation   Structurally  normal trileaflet aortic valve   Tricuspid valve is structurally normal   Trace tricuspid regurgitation   Pulmonary  arterial systolic pressure is estimated to be normal (<36 mmHG)  Trace pulmonic regurgitation present   Mildly dilated left atrium  Normal right atrial size  Normal right ventricular chamber size and function                                                                                                                                                                                                                                                                                                                                                                                                                                                                                                                                                                                          Patient Active Problem List   Diagnosis    Coronary artery disease involving native coronary artery of native heart without angina pectoris    HTN (hypertension), benign    Mixed hyperlipidemia    Obstructive sleep apnea    Dyspnea     Past Surgical History:   Procedure Laterality Date    CABG x 3      PLACEMENT-STENT      x2     Social History     Socioeconomic History    Marital status:    Tobacco Use    Smoking status: Former    Smokeless tobacco: Never   Substance and Sexual Activity    Alcohol use: Not Currently    Drug use: Never        Family History   Problem Relation Name Age of Onset    Stroke Mother      Heart disease Mother      Hypertension Mother      Hyperlipidemia Mother      Parkinsonism Father           Current Outpatient Medications:     albuterol (PROVENTIL/VENTOLIN HFA) 90 mcg/actuation inhaler, Inhale 90 puffs into the lungs as needed., Disp: , Rfl:     amitriptyline (ELAVIL) 50 MG tablet, Take 50 mg by mouth every evening., Disp: , Rfl:     aspirin (ECOTRIN) 81 MG EC tablet, Take 81 mg by mouth once daily.,  Disp: , Rfl:     budesonide-glycopyr-formoterol (BREZTRI AEROSPHERE) 160-9-4.8 mcg/actuation HFAA, Inhale 2 puffs into the lungs 2 (two) times daily., Disp: , Rfl:     citalopram (CELEXA) 40 MG tablet, Take 40 mg by mouth once daily., Disp: , Rfl:     clopidogreL (PLAVIX) 75 mg tablet, Take 1 tablet (75 mg total) by mouth once daily., Disp: 90 tablet, Rfl: 3    isosorbide mononitrate (IMDUR) 30 MG 24 hr tablet, Take 1 tablet (30 mg total) by mouth once daily., Disp: 90 tablet, Rfl: 3    melatonin 5 mg Cap, Take 10 mg by mouth every evening., Disp: , Rfl:     meloxicam (MOBIC) 15 MG tablet, Take 15 mg by mouth daily as needed., Disp: , Rfl:     metoprolol succinate (TOPROL-XL) 25 MG 24 hr tablet, Take 0.5 tablets (12.5 mg total) by mouth once daily., Disp: 45 tablet, Rfl: 3    nitroGLYCERIN (NITROSTAT) 0.4 MG SL tablet, Place 1 tablet (0.4 mg total) under the tongue as needed for Chest pain., Disp: 25 tablet, Rfl: 3    omeprazole (PRILOSEC) 20 MG capsule, Take 20 mg by mouth as needed., Disp: , Rfl:     rosuvastatin (CRESTOR) 40 MG Tab, Take 1 tablet (40 mg total) by mouth once daily., Disp: 90 tablet, Rfl: 3    tamsulosin (FLOMAX) 0.4 mg Cap, Take 1 capsule by mouth once daily., Disp: , Rfl:     busPIRone (BUSPAR) 5 MG Tab, Take 5 mg by mouth 3 (three) times daily. (Patient not taking: Reported on 4/17/2025), Disp: , Rfl:     diclofenac sodium (VOLTAREN) 1 % Gel, Apply 2 g topically 4 (four) times daily as needed (pain). Do not exceed 32 grams/day: do not to exceed 8 grams/day/single joint of upper extremities; do not to exceed 16 grams/day/single joint of lower extremities.  Please request refill of this medication from your PCP., Disp: 100 g, Rfl: 3    HYDROcodone-acetaminophen (NORCO) 7.5-325 mg per tablet, Take 1 tablet by mouth every 6 (six) hours as needed for Pain. (Patient not taking: Reported on 3/7/2024), Disp: 16 tablet, Rfl: 0    omeprazole (PRILOSEC) 40 MG capsule, Take 40 mg by mouth once daily.  "(Patient not taking: Reported on 4/17/2025), Disp: , Rfl:     Current Facility-Administered Medications:     LIDOcaine (PF) 10 mg/ml (1%) injection 50 mg, 5 mL, Intra-articular, 1 time in Clinic/HOD,     triamcinolone acetonide injection 40 mg, 40 mg, Intra-articular, Once,      ROS:                                                                                                                                                                             Review of Systems   Constitutional: Negative.    HENT: Negative.     Eyes: Negative.    Respiratory:  Positive for shortness of breath.    Cardiovascular: Negative.    Gastrointestinal: Negative.    Genitourinary: Negative.    Musculoskeletal: Negative.    Skin: Negative.    Endo/Heme/Allergies: Negative.    Psychiatric/Behavioral: Negative.          Blood pressure 115/79, pulse 62, temperature 98.4 °F (36.9 °C), temperature source Oral, resp. rate 20, height 5' 7" (1.702 m), weight 125.9 kg (277 lb 9.6 oz), SpO2 96%.   PE:  Physical Exam  Constitutional:       Appearance: Normal appearance.   HENT:      Head: Normocephalic.   Eyes:      Pupils: Pupils are equal, round, and reactive to light.   Cardiovascular:      Rate and Rhythm: Normal rate and regular rhythm.      Pulses: Normal pulses.   Pulmonary:      Effort: Pulmonary effort is normal.      Comments: Faint crackles noted bibasilar lung fields.   Musculoskeletal:         General: Normal range of motion.      Right lower leg: Edema (trace BLE edema. nonpitting.) present.      Left lower leg: Edema present.   Skin:     General: Skin is warm and dry.   Neurological:      General: No focal deficit present.      Mental Status: He is alert and oriented to person, place, and time.   Psychiatric:         Mood and Affect: Mood normal.         Behavior: Behavior normal.              ASSESSMENT/PLAN:    Coronary artery disease   - s/p CABG x 3 (Oct. 2020)  - Multiple PCI 2006, 2014, 2017  - Denies CP. Reports stable " VIVEROS  - Continue Aspirin 81 mg, Plavix 75 mg daily, Imdur 30 mg daily, Crestor 40 mg daily, metoprolol succinate 12.5 mg daily, and SL Nitro prn CP.   - Patient remains on DAPT; told by CABG performing surgeon to remain on life, can reassess at next visit  - Counseled on heart healthy diet and exercise as tolerated   - consider repeat echo next time    Bradycardia   - Continue metoprolol succinate 12.5 mg q.day. unable to tolerate higher doses due to dizziness    COPD  VIVEROS - unchanged  - EF 55-60% (1/20/2022)  - Report stable VIVEROS.   - PFT 5/23/24 consistent with moderately severe obstruction, no response to bronchodilator  - will refer pulm    L Leg pain  - Reports intermittent Left leg burning   -  SULTANA testing Oct. 2021 that revealed no evidence of arterial insufficiency with triphasic waveforms throughout.  - may be neuropathy related, will defer the PCP       Hypertension - BP at goal   - Continue current medicatios   - Counseled on low-sodium diet  - reports episodes of spiking pressures and diaphoresis and lightheadedness  - non-exertional, and random, occur x1 per week  - will get urine metanephrines    HLD  - LDL- 70- at goal   - Continue Crestor  40 mg po daily  -Repeat lipid panel prior to next visit  - Counseled patient on low-cholesterol, low-fat diet, and encourage exercise as tolerated    Single subsegmental pulmonary embolism without acute cor pulmonale, provoked s/t surgery  - Xarelto previously discontinued after completion of 3 months of therapy    Obesity   - Counseled on weight loss through diet and exercise    HX of tobacco use  - Smoked 2 PPD x 40 years  - Quit 2020. Counseled on continued smoking cessation     Sleep apnea - CPAP  - Reports nightly compliance with CPAP         RTC in 4 months for follow up      Harriett Torrez MD  Internal Medicine - PGY-1  [facility] - Cardiology Clinic

## 2025-04-17 NOTE — PROGRESS NOTES
"Cardiology Attending  04/17/2025 3:39 PM    I evaluated Huseyin Sheth in Cardiology Clinic and discussed the patient's symptoms, findings, and management plan with the resident.   Mr. Huseyin Sheth is a 59 y.o. male.  The patient is seen in cardiology clinic for CAD, bradycardia, HTN, HLD.  Patient also has history of COPD and leg pain.    Blood pressure 115/79, pulse 62, temperature 98.4 °F (36.9 °C), temperature source Oral, resp. rate 20, height 5' 7" (1.702 m), weight 125.9 kg (277 lb 9.6 oz), SpO2 96%.    Current Outpatient Medications   Medication Instructions    albuterol (PROVENTIL/VENTOLIN HFA) 90 mcg/actuation inhaler 90 puffs, As needed (PRN)    amitriptyline (ELAVIL) 50 mg, Nightly    aspirin (ECOTRIN) 81 mg, Oral, Daily    budesonide-glycopyr-formoterol (BREZTRI AEROSPHERE) 160-9-4.8 mcg/actuation HFAA 2 puffs, 2 times daily    busPIRone (BUSPAR) 5 mg, 3 times daily    citalopram (CELEXA) 40 mg, Daily    clopidogreL (PLAVIX) 75 mg, Oral, Daily    diclofenac sodium (VOLTAREN) 2 g, Topical (Top), 4 times daily PRN, Do not exceed 32 grams/day: do not to exceed 8 grams/day/single joint of upper extremities; do not to exceed 16 grams/day/single joint of lower extremities.  Please request refill of this medication from your PCP.    HYDROcodone-acetaminophen (NORCO) 7.5-325 mg per tablet 1 tablet, Oral, Every 6 hours PRN    isosorbide mononitrate (IMDUR) 30 mg, Oral, Daily    melatonin 10 mg, Nightly    meloxicam (MOBIC) 15 mg, Daily PRN    metoprolol succinate (TOPROL-XL) 12.5 mg, Oral, Daily    nitroGLYCERIN (NITROSTAT) 0.4 mg, Sublingual, As needed (PRN)    omeprazole (PRILOSEC) 20 mg, As needed (PRN)    omeprazole (PRILOSEC) 40 mg, Daily    rosuvastatin (CRESTOR) 40 mg, Oral, Daily    tamsulosin (FLOMAX) 0.4 mg Cap 1 capsule, Daily       No results found for this or any previous visit.    No results found for this or any previous visit.    No results found for this or any previous visit.      Lab Results "   Component Value Date    CHOL 148 03/14/2025      Lab Results   Component Value Date    HGB 15.1 02/22/2024      Lab Results   Component Value Date    CREATININE 1.34 (H) 03/14/2025      Lab Results   Component Value Date    BNP <10 (L) 04/02/2025        Discussed further workup of sweating episodes with the patient  Obtain metanephrines if patient is agreeable    Robin Dave MD

## 2025-04-28 ENCOUNTER — TELEPHONE (OUTPATIENT)
Dept: CARDIOLOGY | Facility: CLINIC | Age: 60
End: 2025-04-28

## 2025-04-28 NOTE — TELEPHONE ENCOUNTER
"----- Message from Nurse Madrid sent at 4/22/2025  3:50 PM CDT -----  Regarding: FW: Pulm Referral  Na x 3, lvm for pt.  ----- Message -----  From: Robin Dave MD  Sent: 4/21/2025   1:48 PM CDT  To: Mercy Rodriguez LPN  Subject: RE: Pulm Referral                                Cardiology does not adjust inhaler therapy.  Would patient like to be referred to a PCP?  ----- Message -----  From: Mercy Rodriguez LPN  Sent: 4/21/2025  12:51 PM CDT  To: Robin Dave MD  Subject: FW: Pulm Referral                                  ----- Message -----  From: Genaro Armendariz LPN  Sent: 4/21/2025  12:49 PM CDT  To: [facility] Cardiology Clinical Support Staff  Subject: Pulm Referral                                    Good Afternoon. Thank you for the Pulmonology referral. At this time, the referral for this patient has been denied by  (Pulmonologist). He states" maximize inhaler therapy, refer back if persistenly dyspneic".  Please do not hesitate to reach out for any further needs.Thanks , ANUEL Lam  "

## 2025-04-28 NOTE — TELEPHONE ENCOUNTER
Unable to reach pt x several attempts for one week, message left x 2 for pt concerning instructions and a possible need for pcp referral. Call back number left for pt.

## 2025-05-12 ENCOUNTER — TELEPHONE (OUTPATIENT)
Dept: CARDIOLOGY | Facility: CLINIC | Age: 60
End: 2025-05-12

## 2025-05-12 NOTE — TELEPHONE ENCOUNTER
"----- Message from Cerenity sent at 5/12/2025 10:00 AM CDT -----  Regarding: Labs  Lv: 4/17/25Nv: 8/19/25Provider: Dr. Thomson called for clarity on a lab they he needs to complete: "Metanephrines, urine". He needs to know if it's fasting and if he needs to schedule it or is it done like regular labs.#It shows as active in his chart under "labs", but I don't see an active request for it on his appt desk.I see that the order was put in by a provider in internal med, but it's under our department. I wasn't sure how to interpret it. Call-back #: [phone]  "

## 2025-07-22 DIAGNOSIS — E78.2 MIXED HYPERLIPIDEMIA: Primary | ICD-10-CM

## 2025-08-14 ENCOUNTER — HOSPITAL ENCOUNTER (OUTPATIENT)
Dept: RADIOLOGY | Facility: HOSPITAL | Age: 60
Discharge: HOME | End: 2025-08-14
Attending: FAMILY MEDICINE

## 2025-08-14 ENCOUNTER — OFFICE VISIT (OUTPATIENT)
Dept: ORTHOPEDICS | Facility: CLINIC | Age: 60
End: 2025-08-14

## 2025-08-14 VITALS
HEART RATE: 66 BPM | TEMPERATURE: 98 F | DIASTOLIC BLOOD PRESSURE: 85 MMHG | HEIGHT: 67 IN | OXYGEN SATURATION: 99 % | WEIGHT: 275.56 LBS | BODY MASS INDEX: 43.25 KG/M2 | SYSTOLIC BLOOD PRESSURE: 154 MMHG

## 2025-08-14 DIAGNOSIS — M25.561 CHRONIC PAIN OF RIGHT KNEE: ICD-10-CM

## 2025-08-14 DIAGNOSIS — M17.11 PRIMARY OSTEOARTHRITIS OF RIGHT KNEE: Primary | ICD-10-CM

## 2025-08-14 DIAGNOSIS — G89.29 CHRONIC PAIN OF RIGHT KNEE: ICD-10-CM

## 2025-08-14 DIAGNOSIS — E66.813 CLASS 3 SEVERE OBESITY WITH BODY MASS INDEX (BMI) OF 40.0 TO 44.9 IN ADULT, UNSPECIFIED OBESITY TYPE, UNSPECIFIED WHETHER SERIOUS COMORBIDITY PRESENT: ICD-10-CM

## 2025-08-14 PROCEDURE — 20610 DRAIN/INJ JOINT/BURSA W/O US: CPT | Mod: PBBFAC,RT

## 2025-08-14 PROCEDURE — 73564 X-RAY EXAM KNEE 4 OR MORE: CPT | Mod: TC,RT

## 2025-08-14 PROCEDURE — 99215 OFFICE O/P EST HI 40 MIN: CPT | Mod: PBBFAC,25

## 2025-08-14 RX ORDER — DICLOFENAC SODIUM 10 MG/G
2 GEL TOPICAL 4 TIMES DAILY
Qty: 100 G | Refills: 3 | Status: SHIPPED | OUTPATIENT
Start: 2025-08-14

## 2025-08-14 RX ORDER — METHYLPREDNISOLONE ACETATE 40 MG/ML
40 INJECTION, SUSPENSION INTRA-ARTICULAR; INTRALESIONAL; INTRAMUSCULAR; SOFT TISSUE
Status: COMPLETED | OUTPATIENT
Start: 2025-08-14 | End: 2025-08-14

## 2025-08-14 RX ORDER — LIDOCAINE HYDROCHLORIDE 10 MG/ML
5 INJECTION, SOLUTION EPIDURAL; INFILTRATION; INTRACAUDAL; PERINEURAL
Status: COMPLETED | OUTPATIENT
Start: 2025-08-14 | End: 2025-08-14

## 2025-08-14 RX ADMIN — METHYLPREDNISOLONE ACETATE 40 MG: 40 INJECTION, SUSPENSION INTRA-ARTICULAR; INTRALESIONAL; INTRAMUSCULAR; SOFT TISSUE at 02:08

## 2025-08-14 RX ADMIN — LIDOCAINE HYDROCHLORIDE 50 MG: 10 INJECTION, SOLUTION EPIDURAL; INFILTRATION; INTRACAUDAL; PERINEURAL at 02:08

## 2025-08-15 ENCOUNTER — LAB VISIT (OUTPATIENT)
Dept: LAB | Facility: HOSPITAL | Age: 60
End: 2025-08-15
Attending: INTERNAL MEDICINE

## 2025-08-15 DIAGNOSIS — E78.2 MIXED HYPERLIPIDEMIA: ICD-10-CM

## 2025-08-15 LAB
CHOLEST SERPL-MCNC: 161 MG/DL
CHOLEST/HDLC SERPL: 3 {RATIO} (ref 0–5)
HDLC SERPL-MCNC: 55 MG/DL (ref 35–60)
LDLC SERPL CALC-MCNC: 91 MG/DL (ref 50–140)
TRIGL SERPL-MCNC: 76 MG/DL (ref 34–140)
VLDLC SERPL CALC-MCNC: 15 MG/DL

## 2025-08-15 PROCEDURE — 36415 COLL VENOUS BLD VENIPUNCTURE: CPT

## 2025-08-15 PROCEDURE — 80061 LIPID PANEL: CPT

## 2025-08-19 ENCOUNTER — LAB VISIT (OUTPATIENT)
Dept: LAB | Facility: HOSPITAL | Age: 60
End: 2025-08-19
Attending: STUDENT IN AN ORGANIZED HEALTH CARE EDUCATION/TRAINING PROGRAM

## 2025-08-19 ENCOUNTER — OFFICE VISIT (OUTPATIENT)
Dept: CARDIOLOGY | Facility: CLINIC | Age: 60
End: 2025-08-19

## 2025-08-19 VITALS
RESPIRATION RATE: 12 BRPM | HEART RATE: 65 BPM | DIASTOLIC BLOOD PRESSURE: 77 MMHG | OXYGEN SATURATION: 100 % | WEIGHT: 279 LBS | BODY MASS INDEX: 43.79 KG/M2 | TEMPERATURE: 98 F | HEIGHT: 67 IN | SYSTOLIC BLOOD PRESSURE: 109 MMHG

## 2025-08-19 DIAGNOSIS — R06.00 DYSPNEA, UNSPECIFIED TYPE: ICD-10-CM

## 2025-08-19 DIAGNOSIS — R06.00 DYSPNEA, UNSPECIFIED TYPE: Primary | ICD-10-CM

## 2025-08-19 LAB — NT-PROBNP SERPL-MCNC: 47 PG/ML

## 2025-08-19 PROCEDURE — 99215 OFFICE O/P EST HI 40 MIN: CPT | Mod: PBBFAC | Performed by: INTERNAL MEDICINE

## 2025-08-19 PROCEDURE — 83880 ASSAY OF NATRIURETIC PEPTIDE: CPT

## 2025-08-19 PROCEDURE — 36415 COLL VENOUS BLD VENIPUNCTURE: CPT

## 2025-08-29 ENCOUNTER — HOSPITAL ENCOUNTER (EMERGENCY)
Facility: HOSPITAL | Age: 60
Discharge: HOME | End: 2025-08-29
Attending: EMERGENCY MEDICINE

## 2025-08-29 VITALS
DIASTOLIC BLOOD PRESSURE: 77 MMHG | HEART RATE: 65 BPM | WEIGHT: 275 LBS | SYSTOLIC BLOOD PRESSURE: 106 MMHG | BODY MASS INDEX: 43.07 KG/M2 | OXYGEN SATURATION: 96 % | TEMPERATURE: 98 F | RESPIRATION RATE: 20 BRPM

## 2025-08-29 DIAGNOSIS — M25.561 POSTERIOR KNEE PAIN, RIGHT: Primary | ICD-10-CM

## 2025-08-29 DIAGNOSIS — Z76.0 MEDICATION REFILL: ICD-10-CM

## 2025-08-29 LAB
ALBUMIN SERPL-MCNC: 3.5 G/DL (ref 3.5–5)
ALBUMIN/GLOB SERPL: 0.9 RATIO (ref 1.1–2)
ALP SERPL-CCNC: 64 UNIT/L (ref 40–150)
ALT SERPL-CCNC: 17 UNIT/L (ref 0–55)
ANION GAP SERPL CALC-SCNC: 7 MEQ/L
APTT PPP: 27 SECONDS (ref 23.2–33.7)
AST SERPL-CCNC: 19 UNIT/L (ref 11–45)
BASOPHILS # BLD AUTO: 0.06 X10(3)/MCL
BASOPHILS NFR BLD AUTO: 0.9 %
BILIRUB SERPL-MCNC: 0.6 MG/DL
BUN SERPL-MCNC: 9.6 MG/DL (ref 8.4–25.7)
CALCIUM SERPL-MCNC: 8.9 MG/DL (ref 8.4–10.2)
CHLORIDE SERPL-SCNC: 104 MMOL/L (ref 98–107)
CO2 SERPL-SCNC: 24 MMOL/L (ref 22–29)
CREAT SERPL-MCNC: 1.25 MG/DL (ref 0.72–1.25)
CREAT/UREA NIT SERPL: 8
D DIMER PPP IA.FEU-MCNC: 0.4 UG/ML FEU (ref 0–0.5)
EOSINOPHIL # BLD AUTO: 0.37 X10(3)/MCL (ref 0–0.9)
EOSINOPHIL NFR BLD AUTO: 5.3 %
ERYTHROCYTE [DISTWIDTH] IN BLOOD BY AUTOMATED COUNT: 14.3 % (ref 11.5–17)
GFR SERPLBLD CREATININE-BSD FMLA CKD-EPI: >60 ML/MIN/1.73/M2
GLOBULIN SER-MCNC: 3.8 GM/DL (ref 2.4–3.5)
GLUCOSE SERPL-MCNC: 104 MG/DL (ref 74–100)
HCT VFR BLD AUTO: 42.3 % (ref 42–52)
HGB BLD-MCNC: 14 G/DL (ref 14–18)
HOLD SPECIMEN: NORMAL
HOLD SPECIMEN: NORMAL
IMM GRANULOCYTES # BLD AUTO: 0.02 X10(3)/MCL (ref 0–0.04)
IMM GRANULOCYTES NFR BLD AUTO: 0.3 %
INR PPP: 1.1
LYMPHOCYTES # BLD AUTO: 1.87 X10(3)/MCL (ref 0.6–4.6)
LYMPHOCYTES NFR BLD AUTO: 26.7 %
MCH RBC QN AUTO: 28.7 PG (ref 27–31)
MCHC RBC AUTO-ENTMCNC: 33.1 G/DL (ref 33–36)
MCV RBC AUTO: 86.9 FL (ref 80–94)
MONOCYTES # BLD AUTO: 0.63 X10(3)/MCL (ref 0.1–1.3)
MONOCYTES NFR BLD AUTO: 9 %
NEUTROPHILS # BLD AUTO: 4.06 X10(3)/MCL (ref 2.1–9.2)
NEUTROPHILS NFR BLD AUTO: 57.8 %
NRBC BLD AUTO-RTO: 0 %
PLATELET # BLD AUTO: 126 X10(3)/MCL (ref 130–400)
PLATELETS.RETICULATED NFR BLD AUTO: 4 % (ref 0.9–11.2)
PMV BLD AUTO: 11.1 FL (ref 7.4–10.4)
POTASSIUM SERPL-SCNC: 3.9 MMOL/L (ref 3.5–5.1)
PROT SERPL-MCNC: 7.3 GM/DL (ref 6.4–8.3)
PROTHROMBIN TIME: 13.8 SECONDS (ref 11.4–14)
RBC # BLD AUTO: 4.87 X10(6)/MCL (ref 4.7–6.1)
SODIUM SERPL-SCNC: 135 MMOL/L (ref 136–145)
WBC # BLD AUTO: 7.01 X10(3)/MCL (ref 4.5–11.5)

## 2025-08-29 PROCEDURE — 85610 PROTHROMBIN TIME: CPT

## 2025-08-29 PROCEDURE — 85730 THROMBOPLASTIN TIME PARTIAL: CPT

## 2025-08-29 PROCEDURE — 63600175 PHARM REV CODE 636 W HCPCS: Mod: JZ,TB

## 2025-08-29 PROCEDURE — 85025 COMPLETE CBC W/AUTO DIFF WBC: CPT

## 2025-08-29 PROCEDURE — 99284 EMERGENCY DEPT VISIT MOD MDM: CPT | Mod: 25

## 2025-08-29 PROCEDURE — 85379 FIBRIN DEGRADATION QUANT: CPT

## 2025-08-29 PROCEDURE — 96372 THER/PROPH/DIAG INJ SC/IM: CPT

## 2025-08-29 PROCEDURE — 80053 COMPREHEN METABOLIC PANEL: CPT

## 2025-08-29 RX ORDER — OMEPRAZOLE 40 MG/1
40 CAPSULE, DELAYED RELEASE ORAL DAILY
Qty: 30 CAPSULE | Refills: 2 | Status: SHIPPED | OUTPATIENT
Start: 2025-08-29 | End: 2025-11-27

## 2025-08-29 RX ORDER — METHOCARBAMOL 500 MG/1
1000 TABLET, FILM COATED ORAL 3 TIMES DAILY
Qty: 30 TABLET | Refills: 0 | Status: SHIPPED | OUTPATIENT
Start: 2025-08-29 | End: 2025-09-03

## 2025-08-29 RX ORDER — TAMSULOSIN HYDROCHLORIDE 0.4 MG/1
1 CAPSULE ORAL DAILY
Qty: 30 CAPSULE | Refills: 2 | Status: SHIPPED | OUTPATIENT
Start: 2025-08-29 | End: 2025-11-27

## 2025-08-29 RX ORDER — DICLOFENAC SODIUM 75 MG/1
75 TABLET, DELAYED RELEASE ORAL 2 TIMES DAILY
Qty: 14 TABLET | Refills: 0 | Status: SHIPPED | OUTPATIENT
Start: 2025-08-29 | End: 2025-09-05

## 2025-08-29 RX ORDER — CITALOPRAM 40 MG/1
40 TABLET ORAL DAILY
Qty: 30 TABLET | Refills: 2 | Status: SHIPPED | OUTPATIENT
Start: 2025-08-29 | End: 2025-11-27

## 2025-08-29 RX ORDER — KETOROLAC TROMETHAMINE 30 MG/ML
30 INJECTION, SOLUTION INTRAMUSCULAR; INTRAVENOUS
Status: COMPLETED | OUTPATIENT
Start: 2025-08-29 | End: 2025-08-29

## 2025-08-29 RX ADMIN — KETOROLAC TROMETHAMINE 30 MG: 60 INJECTION, SOLUTION INTRAMUSCULAR at 02:08
